# Patient Record
Sex: FEMALE | Race: WHITE | NOT HISPANIC OR LATINO | Employment: OTHER | URBAN - NONMETROPOLITAN AREA
[De-identification: names, ages, dates, MRNs, and addresses within clinical notes are randomized per-mention and may not be internally consistent; named-entity substitution may affect disease eponyms.]

---

## 2021-02-01 ENCOUNTER — OFFICE VISIT (OUTPATIENT)
Dept: CARDIOLOGY | Facility: CLINIC | Age: 71
End: 2021-02-01

## 2021-02-01 VITALS
SYSTOLIC BLOOD PRESSURE: 158 MMHG | DIASTOLIC BLOOD PRESSURE: 88 MMHG | BODY MASS INDEX: 25.85 KG/M2 | HEIGHT: 68 IN | HEART RATE: 71 BPM | TEMPERATURE: 97.8 F | WEIGHT: 170.6 LBS | OXYGEN SATURATION: 98 %

## 2021-02-01 DIAGNOSIS — R06.02 SOB (SHORTNESS OF BREATH): Primary | ICD-10-CM

## 2021-02-01 DIAGNOSIS — R53.83 OTHER FATIGUE: ICD-10-CM

## 2021-02-01 DIAGNOSIS — I10 ESSENTIAL HYPERTENSION: ICD-10-CM

## 2021-02-01 PROCEDURE — 99204 OFFICE O/P NEW MOD 45 MIN: CPT | Performed by: PHYSICIAN ASSISTANT

## 2021-02-01 PROCEDURE — 93000 ELECTROCARDIOGRAM COMPLETE: CPT | Performed by: PHYSICIAN ASSISTANT

## 2021-02-01 RX ORDER — LOSARTAN POTASSIUM 25 MG/1
25 TABLET ORAL DAILY
Qty: 30 TABLET | Refills: 11 | Status: SHIPPED | OUTPATIENT
Start: 2021-02-01 | End: 2021-04-21 | Stop reason: SDUPTHER

## 2021-02-01 RX ORDER — ATENOLOL 25 MG/1
25 TABLET ORAL DAILY
COMMUNITY
End: 2021-04-21 | Stop reason: SDUPTHER

## 2021-02-01 NOTE — PROGRESS NOTES
Problem list     Subjective   Ping Ivory is a 70 y.o. female     Chief Complaint   Patient presents with   • Establish Care     presents for cardiac eval   • Hypertension   • Shortness of Breath   • Peripheral Vascular Disease   Chief complaint: Evaluation today for shortness of air, hypertension, and abnormal vascular studies performed recently.    HPI  The patient presents into the clinic today to establish cardiovascular care.  This patient has been evaluated historically through cardiology eventually with catheterization being performed up to 3 years ago with Dr. Mckay.  The patient had minor disease at that time by her report.  She was lost to follow-up through cardiology after that.  The patient has started developing recurrent symptoms and would like referred back for cardiac evaluation.  She presents today in that setting.  The patient tells me that over the last several weeks to few months, she has developed significant dyspnea which now limits activity.  She has no significant chest discomfort or anginal equivalent symptoms otherwise.  Her dyspnea has become severe and she is concerned that this is an anginal equivalent symptom.  She has no associated PND or orthopnea.  She has no dizziness or syncope, although she does admit to recurrent palpitations and tachycardia at times as well.  She remains hypertensive with an average blood pressure of mid 150s over mid 90s when checked at home by her report.  She did have an evaluation recently via a vascular study which suggested possibly hemodynamically significant disease to the right lower extremity.  The left extremities were normal.  By exam today however the patient has adequate pulses to the right upper and right lower extremities.  She has normal pulses otherwise.  She has equal blood pressures to the bilateral upper extremities as well, of note.  The patient has no further complaints otherwise.    Current Outpatient Medications on File Prior to  Visit   Medication Sig Dispense Refill   • atenolol (TENORMIN) 25 MG tablet Take 25 mg by mouth Daily.       No current facility-administered medications on file prior to visit.        Ciprofloxacin hcl and Penicillins    History reviewed. No pertinent past medical history.    Social History     Socioeconomic History   • Marital status:      Spouse name: Not on file   • Number of children: Not on file   • Years of education: Not on file   • Highest education level: Not on file   Tobacco Use   • Smoking status: Never Smoker   • Smokeless tobacco: Never Used   Substance and Sexual Activity   • Alcohol use: Never     Frequency: Never   • Drug use: Never       Family History   Problem Relation Age of Onset   • Heart attack Mother    • Heart disease Mother    • Hypertension Mother    • Heart attack Father    • Heart disease Father    • Heart failure Father        Review of Systems   Constitutional: Negative.  Negative for chills, fatigue and fever.   HENT: Positive for sinus pressure. Negative for ear pain and sore throat.    Eyes: Positive for visual disturbance.   Respiratory: Positive for shortness of breath (with daily activity). Negative for apnea, cough, chest tightness and wheezing.    Cardiovascular: Positive for chest pain (discomfort). Negative for palpitations and leg swelling.   Gastrointestinal: Negative.  Negative for abdominal pain, blood in stool, constipation, diarrhea, nausea and vomiting.   Endocrine: Negative.    Genitourinary: Negative.  Negative for hematuria.   Musculoskeletal: Positive for arthralgias. Negative for back pain, myalgias, neck pain and neck stiffness.   Skin: Negative.    Allergic/Immunologic: Positive for environmental allergies. Negative for food allergies.   Neurological: Negative for dizziness, syncope, weakness, light-headedness, numbness and headaches.   Hematological: Negative.  Does not bruise/bleed easily.   Psychiatric/Behavioral: Negative.  Negative for agitation  "and sleep disturbance. The patient is not nervous/anxious.        Objective   Vitals:    02/01/21 1436 02/01/21 1458 02/01/21 1459   BP: 148/86 151/77 158/88   BP Location: Left arm Left arm Right arm   Patient Position: Sitting Sitting Sitting   Pulse: 72 75 71   Temp: 97.8 °F (36.6 °C)     SpO2: 98%     Weight: 77.4 kg (170 lb 9.6 oz)     Height: 172.7 cm (68\")        /88 (BP Location: Right arm, Patient Position: Sitting)   Pulse 71   Temp 97.8 °F (36.6 °C)   Ht 172.7 cm (68\")   Wt 77.4 kg (170 lb 9.6 oz)   SpO2 98%   BMI 25.94 kg/m²    Lab Results (most recent)     None        Physical Exam  Vitals signs and nursing note reviewed.   Constitutional:       General: She is not in acute distress.     Appearance: She is well-developed.   HENT:      Head: Normocephalic and atraumatic.   Eyes:      Conjunctiva/sclera: Conjunctivae normal.      Pupils: Pupils are equal, round, and reactive to light.   Neck:      Musculoskeletal: Normal range of motion and neck supple.      Vascular: No JVD.      Trachea: No tracheal deviation.   Cardiovascular:      Rate and Rhythm: Normal rate and regular rhythm.      Heart sounds: Normal heart sounds.   Pulmonary:      Effort: Pulmonary effort is normal.      Breath sounds: Normal breath sounds.   Abdominal:      General: Bowel sounds are normal. There is no distension.      Palpations: Abdomen is soft. There is no mass.      Tenderness: There is no abdominal tenderness. There is no guarding or rebound.   Musculoskeletal: Normal range of motion.         General: No tenderness or deformity.   Skin:     General: Skin is warm and dry.      Coloration: Skin is not pale.      Findings: No erythema or rash.   Neurological:      Mental Status: She is alert and oriented to person, place, and time.   Psychiatric:         Behavior: Behavior normal.         Thought Content: Thought content normal.         Judgment: Judgment normal.           Procedure     ECG 12 Lead    Date/Time: " 2/1/2021 2:52 PM  Performed by: Bill Barrera PA  Authorized by: Bill Barrera PA   Comparison: not compared with previous ECG   Previous ECG: no previous ECG available  Comments: Sinus rhythm without acute changes noted.               Assessment/Plan      Diagnosis Plan   1. SOB (shortness of breath)  ECG 12 Lead    Basic Metabolic Panel    Stress Test With Myocardial Perfusion One Day    Adult Transthoracic Echo Complete W/ Cont if Necessary Per Protocol   2. Essential hypertension  ECG 12 Lead    Basic Metabolic Panel    Stress Test With Myocardial Perfusion One Day    Adult Transthoracic Echo Complete W/ Cont if Necessary Per Protocol   3. Other fatigue       1.  The patient has significant dyspnea which she is concerned represents an anginal equivalent symptom.  She is also concerned about her degree of fatigue.  She would like to pursue further cardiac evaluation.  In that setting, I would schedule the patient for nuclear stress testing for ischemia assessment.  It is noted that her catheterization 3 years or so ago indicated basically normal coronaries by patient report.    2.  We will schedule for an echo to evaluate LV size and function, valvular morphologies, etc.    3.  We did review the patient's vascular scan with her today.  This questioned significant disease.  The patient has adequate pulses to the upper and lower extremities.  Blood pressures are equal in bilateral upper extremities.  She has nothing to suggest vascular disease/claudication, etc.  I reviewed with her that I do not feel that she has any significant vascular disease and do not feel that further evaluation is warranted.    4.  She needs improved blood pressure control.  I will continue atenolol 25 mg daily.  With history of borderline bradycardia to bradycardia, I do not feel that we can increase atenolol.  I will start her on losartan 25 mg 1 tab daily and intensify that in dosing if needed.  She will monitor blood pressures  very closely and call to us for any issues.    5.  I will to see her back to review results of all the above.  She will call immediately for any issues prior to follow-up.           Ping Ivory  reports that she has never smoked. She has never used smokeless tobacco..         Patient's Body mass index is 25.94 kg/m². BMI is within normal parameters. No follow-up required..       Advance Care Planning   ACP discussion was held with the patient during this visit. Patient does not have an advance directive, declines further assistance.      Electronically signed by:

## 2021-02-09 ENCOUNTER — LAB (OUTPATIENT)
Dept: LAB | Facility: HOSPITAL | Age: 71
End: 2021-02-09

## 2021-02-09 DIAGNOSIS — I10 ESSENTIAL HYPERTENSION: ICD-10-CM

## 2021-02-09 DIAGNOSIS — R06.02 SOB (SHORTNESS OF BREATH): ICD-10-CM

## 2021-02-09 LAB
ANION GAP SERPL CALCULATED.3IONS-SCNC: 12.9 MMOL/L (ref 5–15)
BUN SERPL-MCNC: 12 MG/DL (ref 8–23)
BUN/CREAT SERPL: 14.3 (ref 7–25)
CALCIUM SPEC-SCNC: 9.6 MG/DL (ref 8.6–10.5)
CHLORIDE SERPL-SCNC: 104 MMOL/L (ref 98–107)
CO2 SERPL-SCNC: 23.1 MMOL/L (ref 22–29)
CREAT SERPL-MCNC: 0.84 MG/DL (ref 0.57–1)
GFR SERPL CREATININE-BSD FRML MDRD: 67 ML/MIN/1.73
GLUCOSE SERPL-MCNC: 91 MG/DL (ref 65–99)
POTASSIUM SERPL-SCNC: 4 MMOL/L (ref 3.5–5.2)
SODIUM SERPL-SCNC: 140 MMOL/L (ref 136–145)

## 2021-02-09 PROCEDURE — 36415 COLL VENOUS BLD VENIPUNCTURE: CPT

## 2021-02-09 PROCEDURE — 80048 BASIC METABOLIC PNL TOTAL CA: CPT

## 2021-02-11 ENCOUNTER — TELEPHONE (OUTPATIENT)
Dept: CARDIOLOGY | Facility: CLINIC | Age: 71
End: 2021-02-11

## 2021-02-11 NOTE — TELEPHONE ENCOUNTER
Patient is aware. Jossy Galarza MA      ----- Message from ARIC Esposito sent at 2/11/2021 11:24 AM EST -----  Losartan started.  Stable parameters noted.

## 2021-03-16 ENCOUNTER — APPOINTMENT (OUTPATIENT)
Dept: CARDIOLOGY | Facility: HOSPITAL | Age: 71
End: 2021-03-16

## 2021-03-16 ENCOUNTER — HOSPITAL ENCOUNTER (OUTPATIENT)
Dept: CARDIOLOGY | Facility: HOSPITAL | Age: 71
Discharge: HOME | End: 2021-03-16

## 2021-03-16 DIAGNOSIS — I10 ESSENTIAL HYPERTENSION: ICD-10-CM

## 2021-03-16 DIAGNOSIS — R06.02 SOB (SHORTNESS OF BREATH): ICD-10-CM

## 2021-03-16 PROCEDURE — 93306 TTE W/DOPPLER COMPLETE: CPT | Performed by: INTERNAL MEDICINE

## 2021-03-16 PROCEDURE — 93306 TTE W/DOPPLER COMPLETE: CPT

## 2021-03-19 ENCOUNTER — APPOINTMENT (OUTPATIENT)
Dept: CARDIOLOGY | Facility: HOSPITAL | Age: 71
End: 2021-03-19

## 2021-03-19 ENCOUNTER — TELEPHONE (OUTPATIENT)
Dept: CARDIOLOGY | Facility: CLINIC | Age: 71
End: 2021-03-19

## 2021-03-19 NOTE — TELEPHONE ENCOUNTER
The PM notified the pt that performing a P2P through [facility] for her stress test is not an option, due to [facility]'s transition to [facility].  The PM will consult with Bill on how to proceed and someone from our office will call her with an update.  Pt instructed to go to the ED with urgent cardiac symptoms or call our office with worsening symptoms.  Pt verbalized an understanding.

## 2021-03-19 NOTE — TELEPHONE ENCOUNTER
Called patient regarding stress test denial. P2P done. Stress test rescheduled. Patient to call office with any concerns or proceed to ER with worsening symptoms. Patient verbalized understanding. Jossy Galarza MA

## 2021-03-27 LAB
BH CV ECHO MEAS - ACS: 1.8 CM
BH CV ECHO MEAS - AO MAX PG: 3.8 MMHG
BH CV ECHO MEAS - AO MEAN PG: 2 MMHG
BH CV ECHO MEAS - AO ROOT AREA (BSA CORRECTED): 1.4
BH CV ECHO MEAS - AO ROOT AREA: 5.9 CM^2
BH CV ECHO MEAS - AO ROOT DIAM: 2.8 CM
BH CV ECHO MEAS - AO V2 MAX: 97.6 CM/SEC
BH CV ECHO MEAS - AO V2 MEAN: 72.1 CM/SEC
BH CV ECHO MEAS - AO V2 VTI: 26 CM
BH CV ECHO MEAS - BSA(HAYCOCK): 1.9 M^2
BH CV ECHO MEAS - BSA: 1.9 M^2
BH CV ECHO MEAS - BZI_BMI: 25.8 KILOGRAMS/M^2
BH CV ECHO MEAS - BZI_METRIC_HEIGHT: 172.7 CM
BH CV ECHO MEAS - BZI_METRIC_WEIGHT: 77.1 KG
BH CV ECHO MEAS - EDV(CUBED): 62.1 ML
BH CV ECHO MEAS - EDV(MOD-SP4): 67.2 ML
BH CV ECHO MEAS - EDV(TEICH): 68.3 ML
BH CV ECHO MEAS - EF(CUBED): 52.9 %
BH CV ECHO MEAS - EF(MOD-SP2): 45.4 %
BH CV ECHO MEAS - EF(MOD-SP4): 52.2 %
BH CV ECHO MEAS - EF(TEICH): 45.4 %
BH CV ECHO MEAS - ESV(CUBED): 29.2 ML
BH CV ECHO MEAS - ESV(MOD-SP4): 32.1 ML
BH CV ECHO MEAS - ESV(TEICH): 37.3 ML
BH CV ECHO MEAS - FS: 22.2 %
BH CV ECHO MEAS - IVS/LVPW: 1.2
BH CV ECHO MEAS - IVSD: 1.1 CM
BH CV ECHO MEAS - LA DIMENSION: 3.4 CM
BH CV ECHO MEAS - LA/AO: 1.2
BH CV ECHO MEAS - LV DIASTOLIC VOL/BSA (35-75): 35.2 ML/M^2
BH CV ECHO MEAS - LV IVRT: 0.11 SEC
BH CV ECHO MEAS - LV MASS(C)D: 132.2 GRAMS
BH CV ECHO MEAS - LV MASS(C)DI: 69.3 GRAMS/M^2
BH CV ECHO MEAS - LV SYSTOLIC VOL/BSA (12-30): 16.8 ML/M^2
BH CV ECHO MEAS - LVIDD: 4 CM
BH CV ECHO MEAS - LVIDS: 3.1 CM
BH CV ECHO MEAS - LVLD AP4: 6.3 CM
BH CV ECHO MEAS - LVLS AP4: 5.4 CM
BH CV ECHO MEAS - LVOT AREA (M): 3.1 CM^2
BH CV ECHO MEAS - LVOT AREA: 3.1 CM^2
BH CV ECHO MEAS - LVOT DIAM: 2 CM
BH CV ECHO MEAS - LVPWD: 0.95 CM
BH CV ECHO MEAS - MV A MAX VEL: 70.3 CM/SEC
BH CV ECHO MEAS - MV DEC SLOPE: 521 CM/SEC^2
BH CV ECHO MEAS - MV E MAX VEL: 85.7 CM/SEC
BH CV ECHO MEAS - MV E/A: 1.2
BH CV ECHO MEAS - RAP SYSTOLE: 10 MMHG
BH CV ECHO MEAS - RVDD: 3.2 CM
BH CV ECHO MEAS - RVSP: 25.7 MMHG
BH CV ECHO MEAS - SI(AO): 81 ML/M^2
BH CV ECHO MEAS - SI(CUBED): 17.2 ML/M^2
BH CV ECHO MEAS - SI(MOD-SP4): 18.4 ML/M^2
BH CV ECHO MEAS - SI(TEICH): 16.3 ML/M^2
BH CV ECHO MEAS - SV(AO): 154.4 ML
BH CV ECHO MEAS - SV(CUBED): 32.9 ML
BH CV ECHO MEAS - SV(MOD-SP4): 35.1 ML
BH CV ECHO MEAS - SV(TEICH): 31 ML
BH CV ECHO MEAS - TR MAX VEL: 198 CM/SEC
MAXIMAL PREDICTED HEART RATE: 148 BPM
STRESS TARGET HR: 126 BPM

## 2021-03-30 ENCOUNTER — TELEPHONE (OUTPATIENT)
Dept: CARDIOLOGY | Facility: CLINIC | Age: 71
End: 2021-03-30

## 2021-03-30 NOTE — TELEPHONE ENCOUNTER
Left VM for patient to return call - test result normal , keep appt for 5/26/21 @ 3:30 pm     AT [facility]         ----- Message from Jossy Galarza MA sent at 3/30/2021 12:04 PM EDT -----    ----- Message -----  From: Bill Barrera PA  Sent: 3/30/2021   8:47 AM EDT  To: Jossy Galarza MA    Routine follow-up.        Result Text  1.  LV size, function, wall motion, and wall thickness are normal.  LV wall thickness is at the upper limits of normal.  Visually estimated ejection fraction is 50 to 55%.  Three-dimensional ejection fraction is 64%.  Grade 1A diastolic dysfunction with mild left atrial enlargement.  Right heart chambers are normal.  No septal defect or intracavitary mass or thrombus.     2.  Valves are morphologically normal with no stenotic lesions or valve associated masses or thrombi.  There is trivial MR, trivial AI, and physiologic TR.     3.  No pericardial or great vessel pathology.     4.  Pulmonary artery pressures cannot be calculated.

## 2021-03-31 NOTE — TELEPHONE ENCOUNTER
Pt LVM stating she was mowing the lawn and missed a call from us.       Called and informed pt of normal results and appt, she verbalized understanding.

## 2021-04-16 ENCOUNTER — HOSPITAL ENCOUNTER (OUTPATIENT)
Dept: CARDIOLOGY | Facility: HOSPITAL | Age: 71
Discharge: HOME | End: 2021-04-16

## 2021-04-16 DIAGNOSIS — R06.02 SOB (SHORTNESS OF BREATH): ICD-10-CM

## 2021-04-16 DIAGNOSIS — I10 ESSENTIAL HYPERTENSION: ICD-10-CM

## 2021-04-16 PROCEDURE — 93018 CV STRESS TEST I&R ONLY: CPT | Performed by: INTERNAL MEDICINE

## 2021-04-16 PROCEDURE — A9500 TC99M SESTAMIBI: HCPCS | Performed by: INTERNAL MEDICINE

## 2021-04-16 PROCEDURE — 0 TECHNETIUM SESTAMIBI: Performed by: INTERNAL MEDICINE

## 2021-04-16 PROCEDURE — 93016 CV STRESS TEST SUPVJ ONLY: CPT | Performed by: NURSE PRACTITIONER

## 2021-04-16 PROCEDURE — 93017 CV STRESS TEST TRACING ONLY: CPT

## 2021-04-16 PROCEDURE — 78452 HT MUSCLE IMAGE SPECT MULT: CPT | Performed by: INTERNAL MEDICINE

## 2021-04-16 PROCEDURE — 78452 HT MUSCLE IMAGE SPECT MULT: CPT

## 2021-04-16 RX ADMIN — TECHNETIUM TC 99M SESTAMIBI 1 DOSE: 1 INJECTION INTRAVENOUS at 09:52

## 2021-04-16 RX ADMIN — TECHNETIUM TC 99M SESTAMIBI 1 DOSE: 1 INJECTION INTRAVENOUS at 08:09

## 2021-04-18 LAB
BH CV REST NUCLEAR ISOTOPE DOSE: 10 MCI
BH CV STRESS NUCLEAR ISOTOPE DOSE: 30 MCI
BH CV STRESS RECOVERY BP: NORMAL MMHG
BH CV STRESS RECOVERY HR: 71 BPM
MAXIMAL PREDICTED HEART RATE: 148 BPM
PERCENT MAX PREDICTED HR: 85.14 %
STRESS BASELINE BP: NORMAL MMHG
STRESS BASELINE HR: 62 BPM
STRESS PERCENT HR: 100 %
STRESS POST ESTIMATED WORKLOAD: 4.6 METS
STRESS POST EXERCISE DUR MIN: 4 MIN
STRESS POST EXERCISE DUR SEC: 25 SEC
STRESS POST PEAK BP: NORMAL MMHG
STRESS POST PEAK HR: 126 BPM
STRESS TARGET HR: 126 BPM

## 2021-04-20 ENCOUNTER — TELEPHONE (OUTPATIENT)
Dept: CARDIOLOGY | Facility: CLINIC | Age: 71
End: 2021-04-20

## 2021-04-20 NOTE — TELEPHONE ENCOUNTER
Patient aware of stress test results. Follow up scheduled in opening tomorrow with Gibran. Jossy Galarza MA      ----- Message from Olga Foley MA sent at 4/20/2021  1:01 PM EDT -----    ----- Message -----  From: Bill Barrera PA  Sent: 4/20/2021   9:57 AM EDT  To: Olga Foley MA    Follow-up within a couple of weeks.    Result Text  1.  Scintigraphy demonstrates a small to moderate sized mildly dense ischemic defect involving the inferoapical and lateral walls.     2.  Preserved post-rest ejection fraction of 65% with no focal wall motion abnormalities.     3.  No evidence of pharmacologically induced transient ischemic dilation or of increased lung uptake of radiopharmaceutical.

## 2021-04-21 ENCOUNTER — OFFICE VISIT (OUTPATIENT)
Dept: CARDIOLOGY | Facility: CLINIC | Age: 71
End: 2021-04-21

## 2021-04-21 VITALS
BODY MASS INDEX: 25.58 KG/M2 | HEART RATE: 62 BPM | OXYGEN SATURATION: 100 % | SYSTOLIC BLOOD PRESSURE: 198 MMHG | WEIGHT: 168.8 LBS | DIASTOLIC BLOOD PRESSURE: 102 MMHG | HEIGHT: 68 IN

## 2021-04-21 DIAGNOSIS — R06.02 SOB (SHORTNESS OF BREATH): ICD-10-CM

## 2021-04-21 DIAGNOSIS — R53.83 FATIGUE, UNSPECIFIED TYPE: ICD-10-CM

## 2021-04-21 DIAGNOSIS — Z01.818 PREPROCEDURAL EXAMINATION: ICD-10-CM

## 2021-04-21 DIAGNOSIS — I10 ESSENTIAL HYPERTENSION: Primary | ICD-10-CM

## 2021-04-21 PROCEDURE — 99214 OFFICE O/P EST MOD 30 MIN: CPT | Performed by: PHYSICIAN ASSISTANT

## 2021-04-21 RX ORDER — ATORVASTATIN CALCIUM 40 MG/1
40 TABLET, FILM COATED ORAL DAILY
Qty: 30 TABLET | Refills: 11 | Status: SHIPPED | OUTPATIENT
Start: 2021-04-21 | End: 2021-06-04 | Stop reason: SDUPTHER

## 2021-04-21 RX ORDER — ASPIRIN 81 MG/1
81 TABLET ORAL DAILY
Qty: 30 TABLET | Refills: 5 | Status: SHIPPED | OUTPATIENT
Start: 2021-04-21

## 2021-04-21 RX ORDER — ATENOLOL 25 MG/1
25 TABLET ORAL DAILY
Qty: 90 TABLET | Refills: 3 | Status: SHIPPED | OUTPATIENT
Start: 2021-04-21 | End: 2022-06-20 | Stop reason: SDUPTHER

## 2021-04-21 RX ORDER — LOSARTAN POTASSIUM 100 MG/1
100 TABLET ORAL DAILY
Qty: 90 TABLET | Refills: 3 | Status: SHIPPED | OUTPATIENT
Start: 2021-04-21 | End: 2022-04-04

## 2021-04-21 NOTE — PROGRESS NOTES
Problem list     Subjective   Ping Ivory is a 70 y.o. female     Chief Complaint   Patient presents with   • Follow-up     Problem list  1.  Profound dyspnea  1.1 stress test April 2021 suggest inferoapical and lateral wall ischemia preserved LV function  2.  History of cardiac catheterization 3 years ago, and adequate data  3.  Hypertension      HPI    Patient is a 70-year-old female that presents back to the office to follow-up.  She saw the office last and was placed on medication to help with blood pressure but because of symptoms, was scheduled undergo testing.  Patient has profound levels of dyspnea and apparently has had lung evaluation with no significant lung disease.  Stress test that showed evidence of possible ischemia.    She is very concerned.  She does not describe any pain or pressure but her dyspnea has been quite profound.  When trying to exert or do activity, she is profoundly short of breath even walking to her car.  Again, she has had lung evaluation with no significant lung disease identified and patient is concerned that she has this profound levels of symptoms despite pulmonary evaluation being stable.  She does not describe PND orthopnea.    She does not describe palpitating having dysrhythmic symptoms.  She does have hypertension and has had poorly controlled blood pressure recently.  She was placed on medication but continues to have significant hypertensive excursions.  Otherwise is stable today      Current Outpatient Medications on File Prior to Visit   Medication Sig Dispense Refill   • [DISCONTINUED] atenolol (TENORMIN) 25 MG tablet Take 25 mg by mouth Daily.     • [DISCONTINUED] losartan (Cozaar) 25 MG tablet Take 1 tablet by mouth Daily. 30 tablet 11     No current facility-administered medications on file prior to visit.       Ciprofloxacin hcl and Penicillins    Past Medical History:   Diagnosis Date   • Hypertension        Social History     Socioeconomic History   • Marital  "status:      Spouse name: Not on file   • Number of children: Not on file   • Years of education: Not on file   • Highest education level: Not on file   Tobacco Use   • Smoking status: Never Smoker   • Smokeless tobacco: Never Used   Substance and Sexual Activity   • Alcohol use: Never   • Drug use: Never   • Sexual activity: Defer       Family History   Problem Relation Age of Onset   • Heart attack Mother    • Heart disease Mother    • Hypertension Mother    • Heart attack Father    • Heart disease Father    • Heart failure Father        Review of Systems   Constitutional: Negative for chills, fatigue and fever.   HENT: Positive for rhinorrhea. Negative for congestion and sore throat.    Eyes: Positive for visual disturbance (glasses).   Respiratory: Positive for shortness of breath. Negative for apnea and chest tightness.    Cardiovascular: Negative.  Negative for chest pain, palpitations and leg swelling.   Gastrointestinal: Negative.    Endocrine: Negative.    Genitourinary: Negative.    Musculoskeletal: Negative.  Negative for back pain, gait problem, neck pain and neck stiffness.   Skin: Negative for rash and wound.   Allergic/Immunologic: Positive for environmental allergies (seasonal allergies) and food allergies (eggs).   Neurological: Negative.  Negative for dizziness, weakness, light-headedness, numbness and headaches.   Hematological: Negative.  Does not bruise/bleed easily.   Psychiatric/Behavioral: Positive for sleep disturbance (denies SoA at HS).       Objective   Vitals:    04/21/21 1055   BP: (!) 198/102   BP Location: Left arm   Patient Position: Sitting   Pulse: 62   SpO2: 100%   Weight: 76.6 kg (168 lb 12.8 oz)   Height: 172.7 cm (67.99\")      BP (!) 198/102 (BP Location: Left arm, Patient Position: Sitting)   Pulse 62   Ht 172.7 cm (67.99\")   Wt 76.6 kg (168 lb 12.8 oz)   SpO2 100%   BMI 25.67 kg/m²     Lab Results (most recent)     None          Physical Exam  Vitals and nursing " note reviewed.   Constitutional:       General: She is not in acute distress.     Appearance: Normal appearance. She is well-developed.   HENT:      Head: Normocephalic and atraumatic.   Eyes:      General: No scleral icterus.        Right eye: No discharge.         Left eye: No discharge.      Conjunctiva/sclera: Conjunctivae normal.   Neck:      Vascular: No carotid bruit.   Cardiovascular:      Rate and Rhythm: Normal rate and regular rhythm.      Heart sounds: Normal heart sounds. No murmur heard.   No friction rub. No gallop.    Pulmonary:      Effort: Pulmonary effort is normal. No respiratory distress.      Breath sounds: Normal breath sounds. No wheezing or rales.   Chest:      Chest wall: No tenderness.   Musculoskeletal:      Right lower leg: No edema.      Left lower leg: No edema.   Skin:     General: Skin is warm and dry.      Coloration: Skin is not pale.      Findings: No erythema or rash.   Neurological:      Mental Status: She is alert and oriented to person, place, and time.      Cranial Nerves: No cranial nerve deficit.   Psychiatric:         Behavior: Behavior normal.         Procedure   Procedures       Assessment/Plan     Problems Addressed this Visit        Cardiac and Vasculature    Essential hypertension - Primary    Relevant Medications    losartan (Cozaar) 100 MG tablet    atenolol (TENORMIN) 25 MG tablet    Other Relevant Orders    [facility]    CBC & Differential    Comprehensive Metabolic Panel    D-dimer, Quantitative       Pulmonary and Pneumonias    SOB (shortness of breath)    Relevant Orders    [facility]    CBC & Differential    Comprehensive Metabolic Panel    D-dimer, Quantitative       Symptoms and Signs    Fatigue    Relevant Orders    [facility]    CBC & Differential    Comprehensive Metabolic Panel    D-dimer, Quantitative      Other Visit Diagnoses     Preprocedural examination        Relevant Orders    COVID-19,[facility] ROUTINE, NP/OP SWAB IN  TRANSPORT MEDIA OR ESWAB 72 HR TAT - Swab, Nasopharynx      Diagnoses       Codes Comments    Essential hypertension    -  Primary ICD-10-CM: I10  ICD-9-CM: 401.9     SOB (shortness of breath)     ICD-10-CM: R06.02  ICD-9-CM: 786.05     Fatigue, unspecified type     ICD-10-CM: R53.83  ICD-9-CM: 780.79     Preprocedural examination     ICD-10-CM: Z01.818  ICD-9-CM: V72.84         Recommendation  1.  I am concerned about patient's profound level of dyspnea in the setting that she does not have any significant lung disease.  Stress test suggest potential ischemia in the apical segment but also lateral segment possibly representing 2 vascular distributions.  This would be at the very least, an intermediate stress test.  Because she continues to have profound levels of symptoms that are limiting her functional status with risk factors, she will be scheduled for catheterization to exclude potential coronary disease    2.  I am increasing losartan to 100 mg hopefully help with blood pressure issues.  We will place her on antiplatelet and statin therapy.    3.  We will see her back for follow-up after catheterization.  Follow-up with primary as scheduled             Ping Ivory  reports that she has never smoked. She has never used smokeless tobacco.        Patient's Body mass index is 25.67 kg/m². BMI is within normal parameters. No follow-up required..       Advance Care Planning   ACP discussion was held with the patient during this visit. Patient has an advance directive (not in EMR), copy requested.    Electronically signed by:

## 2021-04-21 NOTE — PATIENT INSTRUCTIONS

## 2021-04-26 ENCOUNTER — TELEPHONE (OUTPATIENT)
Dept: CARDIOLOGY | Facility: CLINIC | Age: 71
End: 2021-04-26

## 2021-04-26 NOTE — TELEPHONE ENCOUNTER
Gibran Coats PA Lanum, Emily  Caller: Unspecified (Today,  3:24 PM)  Just routine medication prior to catheterization         Called and informed pt of the above, she verbalized understanding.

## 2021-04-26 NOTE — TELEPHONE ENCOUNTER
Pty LVM stating she doesn't know why she was put on Lipitor, stated she didn't know she had any issues w/ cholesterol.         Per chart review, no recent cholesterol levels in Epic. OV note doesn't mention why rx was started.

## 2021-05-13 ENCOUNTER — TELEPHONE (OUTPATIENT)
Dept: CARDIOLOGY | Facility: CLINIC | Age: 71
End: 2021-05-13

## 2021-05-13 NOTE — TELEPHONE ENCOUNTER
Left voicemail for patient to return call to clinic to let me know where she had previous cardiac cath so we can obtain report.

## 2021-05-19 ENCOUNTER — TELEPHONE (OUTPATIENT)
Dept: CARDIOLOGY | Facility: CLINIC | Age: 71
End: 2021-05-19

## 2021-05-19 NOTE — TELEPHONE ENCOUNTER
Pt LVM stating that she wants to register and get labs and everything at [facility] at one time.     Per chart review, pt's [facility] is scheduled for 5/27/21, needs to get labs done ASAP so we have time to treat if issue w/ PLT or CREAT.       Called and advised pt to get labs done today or tomorrow, stated she doesn't register until am of procedure and to be 2 hours early, as stated on her paperwork. She stated she would come here for labs.

## 2021-05-20 ENCOUNTER — LAB (OUTPATIENT)
Dept: LAB | Facility: HOSPITAL | Age: 71
End: 2021-05-20

## 2021-05-20 DIAGNOSIS — R06.02 SOB (SHORTNESS OF BREATH): ICD-10-CM

## 2021-05-20 DIAGNOSIS — I10 ESSENTIAL HYPERTENSION: ICD-10-CM

## 2021-05-20 DIAGNOSIS — R53.83 FATIGUE, UNSPECIFIED TYPE: ICD-10-CM

## 2021-05-20 LAB
ALBUMIN SERPL-MCNC: 4.52 G/DL (ref 3.5–5.2)
ALBUMIN/GLOB SERPL: 1.6 G/DL
ALP SERPL-CCNC: 101 U/L (ref 39–117)
ALT SERPL W P-5'-P-CCNC: 20 U/L (ref 1–33)
ANION GAP SERPL CALCULATED.3IONS-SCNC: 9.9 MMOL/L (ref 5–15)
AST SERPL-CCNC: 41 U/L (ref 1–32)
BASOPHILS # BLD AUTO: 0.06 10*3/MM3 (ref 0–0.2)
BASOPHILS NFR BLD AUTO: 1 % (ref 0–1.5)
BILIRUB SERPL-MCNC: 0.5 MG/DL (ref 0–1.2)
BUN SERPL-MCNC: 14 MG/DL (ref 8–23)
BUN/CREAT SERPL: 14 (ref 7–25)
CALCIUM SPEC-SCNC: 9.6 MG/DL (ref 8.6–10.5)
CHLORIDE SERPL-SCNC: 103 MMOL/L (ref 98–107)
CO2 SERPL-SCNC: 27.1 MMOL/L (ref 22–29)
CREAT SERPL-MCNC: 1 MG/DL (ref 0.57–1)
D DIMER PPP FEU-MCNC: 0.35 MCGFEU/ML (ref 0–0.5)
DEPRECATED RDW RBC AUTO: 45.8 FL (ref 37–54)
EOSINOPHIL # BLD AUTO: 0.1 10*3/MM3 (ref 0–0.4)
EOSINOPHIL NFR BLD AUTO: 1.6 % (ref 0.3–6.2)
ERYTHROCYTE [DISTWIDTH] IN BLOOD BY AUTOMATED COUNT: 13.2 % (ref 12.3–15.4)
GFR SERPL CREATININE-BSD FRML MDRD: 55 ML/MIN/1.73
GLOBULIN UR ELPH-MCNC: 2.9 GM/DL
GLUCOSE SERPL-MCNC: 81 MG/DL (ref 65–99)
HCT VFR BLD AUTO: 43.3 % (ref 34–46.6)
HGB BLD-MCNC: 13.7 G/DL (ref 12–15.9)
IMM GRANULOCYTES # BLD AUTO: 0.02 10*3/MM3 (ref 0–0.05)
IMM GRANULOCYTES NFR BLD AUTO: 0.3 % (ref 0–0.5)
LYMPHOCYTES # BLD AUTO: 1.74 10*3/MM3 (ref 0.7–3.1)
LYMPHOCYTES NFR BLD AUTO: 28.1 % (ref 19.6–45.3)
MCH RBC QN AUTO: 30 PG (ref 26.6–33)
MCHC RBC AUTO-ENTMCNC: 31.6 G/DL (ref 31.5–35.7)
MCV RBC AUTO: 94.7 FL (ref 79–97)
MONOCYTES # BLD AUTO: 0.54 10*3/MM3 (ref 0.1–0.9)
MONOCYTES NFR BLD AUTO: 8.7 % (ref 5–12)
NEUTROPHILS NFR BLD AUTO: 3.74 10*3/MM3 (ref 1.7–7)
NEUTROPHILS NFR BLD AUTO: 60.3 % (ref 42.7–76)
NRBC BLD AUTO-RTO: 0 /100 WBC (ref 0–0.2)
PLATELET # BLD AUTO: 179 10*3/MM3 (ref 140–450)
PMV BLD AUTO: 11.5 FL (ref 6–12)
POTASSIUM SERPL-SCNC: 4 MMOL/L (ref 3.5–5.2)
PROT SERPL-MCNC: 7.4 G/DL (ref 6–8.5)
RBC # BLD AUTO: 4.57 10*6/MM3 (ref 3.77–5.28)
SODIUM SERPL-SCNC: 140 MMOL/L (ref 136–145)
WBC # BLD AUTO: 6.2 10*3/MM3 (ref 3.4–10.8)

## 2021-05-20 PROCEDURE — 36415 COLL VENOUS BLD VENIPUNCTURE: CPT

## 2021-05-20 PROCEDURE — 80053 COMPREHEN METABOLIC PANEL: CPT

## 2021-05-20 PROCEDURE — 85379 FIBRIN DEGRADATION QUANT: CPT

## 2021-05-20 PROCEDURE — 85025 COMPLETE CBC W/AUTO DIFF WBC: CPT

## 2021-05-27 ENCOUNTER — OUTSIDE FACILITY SERVICE (OUTPATIENT)
Dept: CARDIOLOGY | Facility: CLINIC | Age: 71
End: 2021-05-27

## 2021-05-27 DIAGNOSIS — I25.84 CORONARY ARTERY DISEASE DUE TO CALCIFIED CORONARY LESION: Primary | ICD-10-CM

## 2021-05-27 DIAGNOSIS — I25.10 CORONARY ARTERY DISEASE DUE TO CALCIFIED CORONARY LESION: Primary | ICD-10-CM

## 2021-05-27 PROCEDURE — 92928 PRQ TCAT PLMT NTRAC ST 1 LES: CPT | Performed by: INTERNAL MEDICINE

## 2021-05-27 PROCEDURE — 92978 ENDOLUMINL IVUS OCT C 1ST: CPT | Performed by: INTERNAL MEDICINE

## 2021-05-27 PROCEDURE — 93458 L HRT ARTERY/VENTRICLE ANGIO: CPT | Performed by: INTERNAL MEDICINE

## 2021-05-27 RX ORDER — CLOPIDOGREL BISULFATE 75 MG/1
75 TABLET ORAL DAILY
Qty: 30 TABLET | Refills: 11 | Status: SHIPPED | OUTPATIENT
Start: 2021-05-27 | End: 2021-08-23 | Stop reason: SDUPTHER

## 2021-05-27 NOTE — TELEPHONE ENCOUNTER
T/O PER DR. CHIU TO SEND IN PLAVIX 75 MG PO DAILY TO PATIENT'S PHARMACY DUE TO REQUIRED STENTING PER [facility] TODAY. SCRIPT ELECTRO'D TO PHARMACY. PH,LPN

## 2021-06-04 ENCOUNTER — OFFICE VISIT (OUTPATIENT)
Dept: CARDIOLOGY | Facility: CLINIC | Age: 71
End: 2021-06-04

## 2021-06-04 VITALS
DIASTOLIC BLOOD PRESSURE: 89 MMHG | SYSTOLIC BLOOD PRESSURE: 174 MMHG | HEART RATE: 66 BPM | OXYGEN SATURATION: 99 % | WEIGHT: 168 LBS | HEIGHT: 68 IN | BODY MASS INDEX: 25.46 KG/M2

## 2021-06-04 DIAGNOSIS — I25.118 CORONARY ARTERY DISEASE OF NATIVE ARTERY OF NATIVE HEART WITH STABLE ANGINA PECTORIS (HCC): Primary | ICD-10-CM

## 2021-06-04 DIAGNOSIS — I10 ESSENTIAL HYPERTENSION: ICD-10-CM

## 2021-06-04 DIAGNOSIS — Z98.890 HISTORY OF LEFT HEART CATHETERIZATION (LHC): ICD-10-CM

## 2021-06-04 PROCEDURE — 99214 OFFICE O/P EST MOD 30 MIN: CPT | Performed by: NURSE PRACTITIONER

## 2021-06-04 RX ORDER — ATORVASTATIN CALCIUM 40 MG/1
40 TABLET, FILM COATED ORAL DAILY
Qty: 30 TABLET | Refills: 11 | Status: SHIPPED | OUTPATIENT
Start: 2021-06-04 | End: 2021-07-07 | Stop reason: SDUPTHER

## 2021-06-04 NOTE — PATIENT INSTRUCTIONS

## 2021-06-04 NOTE — PROGRESS NOTES
Subjective     Ping Ivory is a 70 y.o. female who presents to day for Heart cath follow up (May 27th x one stent mLAD, rt wrist).    CHIEF COMPLIANT  Chief Complaint   Patient presents with   • Heart cath follow up     May 27th x one stent mLAD, rt wrist       Active Problems:  Problem List Items Addressed This Visit        Cardiac and Vasculature    Essential hypertension    Relevant Orders    Lipid Panel      Other Visit Diagnoses     Coronary artery disease of native artery of native heart with stable angina pectoris (CMS/HCC)    -  Primary    Relevant Medications    atorvastatin (LIPITOR) 40 MG tablet    Other Relevant Orders    Lipid Panel    History of left heart catheterization (LHC)            Problem list  1.  Profound dyspnea  1.1 stress test April 2021 suggest inferoapical and lateral wall ischemia preserved LV function  1.2 left heart cath 5/27/2021 stent to the LAD  2.  History of cardiac catheterization 3 years ago, and adequate data  3.  Hypertension      HPI  HPI   Patient is a pleasant 70-year-old female who presents to the clinic today for follow-up status post left heart cath with Dr. Russell Moreira on 5/27/2021.  Patient was referred for left heart cath given shortness of breath and ischemia on nuclear stress test.  Patient underwent left heart cath with selective left and right coronary cineangiography, left ventriculography, and intravascular ultrasound with stenting to the left anterior descending artery.  Ejection fraction was estimated at 55% with no focal wall motion abnormalities or mitral regurgitation.  Left ventricular end-diastolic pressure was 11 mmHg.  Patient presents today with a stable right radial cath site which is clean dry and intact without evidence of hematoma, redness or drainage.    Patient states that she feels much improved since having her heart cath.  She states that she is able to ambulate down to her mailbox without shortness of breath.  She denies any chest pain  or pressure.  She denies any palpitations or fluttering.  She denies any PND or orthopnea.  She denies any dizziness or syncope.  She denies any edema or swelling.    PRIOR MEDS  Current Outpatient Medications on File Prior to Visit   Medication Sig Dispense Refill   • aspirin (aspirin) 81 MG EC tablet Take 1 tablet by mouth Daily. 30 tablet 5   • atenolol (TENORMIN) 25 MG tablet Take 1 tablet by mouth Daily. 90 tablet 3   • clopidogrel (PLAVIX) 75 MG tablet Take 1 tablet by mouth Daily. 30 tablet 11   • losartan (Cozaar) 100 MG tablet Take 1 tablet by mouth Daily. 90 tablet 3     No current facility-administered medications on file prior to visit.       ALLERGIES  Ciprofloxacin hcl and Penicillins    HISTORY  Past Medical History:   Diagnosis Date   • Hypertension        Social History     Socioeconomic History   • Marital status:      Spouse name: Not on file   • Number of children: Not on file   • Years of education: Not on file   • Highest education level: Not on file   Tobacco Use   • Smoking status: Never Smoker   • Smokeless tobacco: Never Used   Substance and Sexual Activity   • Alcohol use: Never   • Drug use: Never   • Sexual activity: Defer       Family History   Problem Relation Age of Onset   • Heart attack Mother    • Heart disease Mother    • Hypertension Mother    • Heart attack Father    • Heart disease Father    • Heart failure Father        Review of Systems   Constitutional: Negative.  Negative for chills, fatigue and fever.   HENT: Negative.  Negative for congestion, sinus pain and sore throat.    Eyes: Positive for visual disturbance (glasses).   Respiratory: Positive for cough. Negative for chest tightness and shortness of breath.    Cardiovascular: Negative.  Negative for chest pain, palpitations and leg swelling.   Gastrointestinal: Negative.  Negative for abdominal pain, blood in stool, constipation, diarrhea, nausea and vomiting.   Endocrine: Positive for cold intolerance. Negative  "for heat intolerance.   Genitourinary: Negative.  Negative for dysuria, frequency, hematuria and urgency.   Musculoskeletal: Negative.  Negative for arthralgias, back pain and neck pain.   Skin: Negative.  Negative for rash.   Allergic/Immunologic: Positive for environmental allergies and food allergies (eggs).   Neurological: Negative.  Negative for dizziness, syncope and light-headedness.   Hematological: Negative.  Does not bruise/bleed easily.   Psychiatric/Behavioral: Negative for sleep disturbance (denies waking with soa or cp).       Objective     VITALS: /89 (BP Location: Left arm, Patient Position: Sitting)   Pulse 66   Ht 172.7 cm (68\")   Wt 76.2 kg (168 lb)   SpO2 99%   BMI 25.54 kg/m²     LABS:   Lab Results (most recent)     None          IMAGING:   No Images in the past 120 days found..    EXAM:  Physical Exam  Vitals and nursing note reviewed.   Constitutional:       General: She is not in acute distress.     Appearance: Normal appearance. She is well-developed.   HENT:      Head: Normocephalic and atraumatic.   Eyes:      General: No scleral icterus.        Right eye: No discharge.         Left eye: No discharge.      Conjunctiva/sclera: Conjunctivae normal.   Neck:      Vascular: No carotid bruit.   Cardiovascular:      Rate and Rhythm: Normal rate and regular rhythm.      Pulses:           Radial pulses are 2+ on the right side and 2+ on the left side.      Heart sounds: Normal heart sounds. No murmur heard.   No friction rub. No gallop.    Pulmonary:      Effort: Pulmonary effort is normal. No respiratory distress.      Breath sounds: Normal breath sounds. No wheezing or rales.   Chest:      Chest wall: No tenderness.   Musculoskeletal:      Right lower leg: No edema.      Left lower leg: No edema.   Skin:     General: Skin is warm and dry.      Capillary Refill: Capillary refill takes less than 2 seconds.      Coloration: Skin is not pale.      Findings: No erythema or rash.        "   Neurological:      Mental Status: She is alert and oriented to person, place, and time.      Cranial Nerves: No cranial nerve deficit.   Psychiatric:         Behavior: Behavior normal.         Procedure   Procedures       Assessment/Plan     Diagnoses and all orders for this visit:    1. Coronary artery disease of native artery of native heart with stable angina pectoris (CMS/HCC) (Primary)  -     atorvastatin (LIPITOR) 40 MG tablet; Take 1 tablet by mouth Daily.  Dispense: 30 tablet; Refill: 11  -     Lipid Panel; Future    2. History of left heart catheterization (LHC)    3. Essential hypertension  -     Lipid Panel; Future      Patient doing well from a cardiac standpoint.  Patient will continue dual antiplatelet therapy with Plavix and aspirin giving recent stent to the left anterior descending artery.  Patient denies any bleeding or blood loss.  She will monitor for such and report any bleeding.  We will start the patient on Lipitor for cholesterol management and get a lipid panel prior to her next visit. Consider modifying antihypertensive medications due to poorly controlled blood pressure.  We will see the patient back in 6 months or sooner for new onset or worsening symptoms.    Return in about 6 months (around 12/4/2021).      Advance Care Planning   ACP discussion was held with the patient during this visit. Patient does not have an advance directive, declines further assistance.          MEDS ORDERED DURING VISIT:  New Medications Ordered This Visit   Medications   • atorvastatin (LIPITOR) 40 MG tablet     Sig: Take 1 tablet by mouth Daily.     Dispense:  30 tablet     Refill:  11       As always, Ion Mason APRN  I appreciate very much the opportunity to participate in the cardiovascular care of your patients. Please do not hesitate to call me with any questions with regards to Ping Ivory evaluation and management.     This document has been electronically signed by Veronica Wilson  APRN  June 11, 2021 12:16 EDT

## 2021-07-07 DIAGNOSIS — I25.118 CORONARY ARTERY DISEASE OF NATIVE ARTERY OF NATIVE HEART WITH STABLE ANGINA PECTORIS (HCC): ICD-10-CM

## 2021-07-07 RX ORDER — ATORVASTATIN CALCIUM 40 MG/1
40 TABLET, FILM COATED ORAL DAILY
Qty: 90 TABLET | Refills: 3 | Status: SHIPPED | OUTPATIENT
Start: 2021-07-07 | End: 2021-11-02 | Stop reason: SDUPTHER

## 2021-08-23 DIAGNOSIS — I25.10 CORONARY ARTERY DISEASE DUE TO CALCIFIED CORONARY LESION: ICD-10-CM

## 2021-08-23 DIAGNOSIS — I25.84 CORONARY ARTERY DISEASE DUE TO CALCIFIED CORONARY LESION: ICD-10-CM

## 2021-08-23 RX ORDER — CLOPIDOGREL BISULFATE 75 MG/1
75 TABLET ORAL DAILY
Qty: 90 TABLET | Refills: 3 | Status: SHIPPED | OUTPATIENT
Start: 2021-08-23 | End: 2022-10-11

## 2021-10-28 ENCOUNTER — OFFICE VISIT (OUTPATIENT)
Dept: CARDIOLOGY | Facility: CLINIC | Age: 71
End: 2021-10-28

## 2021-10-28 VITALS
OXYGEN SATURATION: 99 % | HEART RATE: 63 BPM | SYSTOLIC BLOOD PRESSURE: 172 MMHG | HEIGHT: 68 IN | WEIGHT: 166.2 LBS | BODY MASS INDEX: 25.19 KG/M2 | DIASTOLIC BLOOD PRESSURE: 92 MMHG

## 2021-10-28 DIAGNOSIS — I25.84 CORONARY ARTERY DISEASE DUE TO CALCIFIED CORONARY LESION: Primary | ICD-10-CM

## 2021-10-28 DIAGNOSIS — R06.02 SOB (SHORTNESS OF BREATH): ICD-10-CM

## 2021-10-28 DIAGNOSIS — R53.83 FATIGUE, UNSPECIFIED TYPE: ICD-10-CM

## 2021-10-28 DIAGNOSIS — I10 ESSENTIAL HYPERTENSION: ICD-10-CM

## 2021-10-28 DIAGNOSIS — I25.10 CORONARY ARTERY DISEASE DUE TO CALCIFIED CORONARY LESION: Primary | ICD-10-CM

## 2021-10-28 PROCEDURE — 99214 OFFICE O/P EST MOD 30 MIN: CPT | Performed by: PHYSICIAN ASSISTANT

## 2021-10-28 RX ORDER — ISOSORBIDE MONONITRATE 30 MG/1
30 TABLET, EXTENDED RELEASE ORAL DAILY
Qty: 30 TABLET | Refills: 11 | Status: SHIPPED | OUTPATIENT
Start: 2021-10-28 | End: 2021-10-28

## 2021-10-28 RX ORDER — ISOSORBIDE MONONITRATE 30 MG/1
30 TABLET, EXTENDED RELEASE ORAL DAILY
Qty: 30 TABLET | Refills: 11 | Status: SHIPPED | OUTPATIENT
Start: 2021-10-28 | End: 2021-11-23 | Stop reason: SDUPTHER

## 2021-10-28 NOTE — PATIENT INSTRUCTIONS

## 2021-10-28 NOTE — PROGRESS NOTES
Problem list     Subjective   Ping Ivory is a 71 y.o. female     Chief Complaint   Patient presents with   • Follow-up   • Shortness of Breath     ?? COPD/ CT chest from ER 10/28/21 / lab work ( waiting on from [facility] )    Problem list:  1.  Coronary artery disease.  1.1.  Stenting of the LAD, 5/27/2021.  1.2.  Residual disease of 50% in the circumflex with recommendations to treat that medically.  2.  Preserved systolic function.  3.  Hypertension  4.  Dyslipidemia    HPI  The patient presents into the clinic today for routine follow-up.  She is referred back because of symptoms.  She has significant dyspnea, fatigue, and an overall sense of not feeling well at all.  She really has no chest pain but never really did even prior to stenting.  She tells me that she never had any improvement post stenting of the LAD.  She has been evaluated through pulmonology and is currently pending PFT evaluation.  Because of significant dyspnea recently, she was empirically treated with antibiotic therapy.  She never improved.  She was also treated with inhalers and nebulized therapy, again with no improvement noted in symptoms.  Symptoms do appear to be progressing particularly over the last 6 weeks.  She is concerned and would like repeat cardiac evaluation.  She presents today in that setting.    Current Outpatient Medications on File Prior to Visit   Medication Sig Dispense Refill   • albuterol (PROVENTIL) (5 MG/ML) 0.5% nebulizer solution Take 2.5 mg by nebulization Every 6 (Six) Hours As Needed for Wheezing.     • aspirin (aspirin) 81 MG EC tablet Take 1 tablet by mouth Daily. 30 tablet 5   • atenolol (TENORMIN) 25 MG tablet Take 1 tablet by mouth Daily. 90 tablet 3   • atorvastatin (LIPITOR) 40 MG tablet Take 1 tablet by mouth Daily. 90 tablet 3   • clopidogrel (PLAVIX) 75 MG tablet Take 1 tablet by mouth Daily. 90 tablet 3   • Fluticasone-Umeclidin-Vilant (Trelegy Ellipta) 100-62.5-25 MCG/INH inhaler Inhale 1  "puff Daily.     • losartan (Cozaar) 100 MG tablet Take 1 tablet by mouth Daily. 90 tablet 3     No current facility-administered medications on file prior to visit.       Ciprofloxacin hcl and Penicillins    Past Medical History:   Diagnosis Date   • Hypertension        Social History     Socioeconomic History   • Marital status:    Tobacco Use   • Smoking status: Never Smoker   • Smokeless tobacco: Never Used   Substance and Sexual Activity   • Alcohol use: Never   • Drug use: Never   • Sexual activity: Defer       Family History   Problem Relation Age of Onset   • Heart attack Mother    • Heart disease Mother    • Hypertension Mother    • Heart attack Father    • Heart disease Father    • Heart failure Father        Review of Systems   Constitutional: Positive for fatigue. Negative for chills and fever.   HENT: Negative for congestion, postnasal drip and sore throat.    Eyes: Positive for visual disturbance (glasses).   Respiratory: Positive for shortness of breath. Negative for chest tightness and wheezing.    Cardiovascular: Positive for leg swelling. Negative for chest pain and palpitations.   Gastrointestinal: Negative.    Endocrine: Negative.    Genitourinary: Negative.    Musculoskeletal: Negative.  Negative for arthralgias, back pain and neck pain.   Skin: Negative.  Negative for rash and wound.   Allergic/Immunologic: Positive for environmental allergies.   Neurological: Positive for headaches. Negative for dizziness, weakness and numbness.   Hematological: Bruises/bleeds easily (bruises/ bleeds).   Psychiatric/Behavioral: Negative.  Negative for sleep disturbance.       Objective   Vitals:    10/28/21 0837   BP: 172/92   BP Location: Left arm   Patient Position: Sitting   Pulse: 63   SpO2: 99%   Weight: 75.4 kg (166 lb 3.2 oz)   Height: 172.7 cm (68\")      /92 (BP Location: Left arm, Patient Position: Sitting)   Pulse 63   Ht 172.7 cm (68\")   Wt 75.4 kg (166 lb 3.2 oz)   SpO2 99%   BMI " 25.27 kg/m²    Lab Results (most recent)     None        Physical Exam  Vitals and nursing note reviewed.   Constitutional:       General: She is not in acute distress.     Appearance: She is well-developed.   HENT:      Head: Normocephalic and atraumatic.   Eyes:      Conjunctiva/sclera: Conjunctivae normal.      Pupils: Pupils are equal, round, and reactive to light.   Neck:      Vascular: No JVD.      Trachea: No tracheal deviation.   Cardiovascular:      Rate and Rhythm: Normal rate and regular rhythm.      Heart sounds: Normal heart sounds.   Pulmonary:      Effort: Pulmonary effort is normal.      Breath sounds: Normal breath sounds.   Abdominal:      General: Bowel sounds are normal. There is no distension.      Palpations: Abdomen is soft. There is no mass.      Tenderness: There is no abdominal tenderness. There is no guarding or rebound.   Musculoskeletal:         General: No tenderness or deformity. Normal range of motion.      Cervical back: Normal range of motion and neck supple.   Skin:     General: Skin is warm and dry.      Coloration: Skin is not pale.      Findings: No erythema or rash.   Neurological:      Mental Status: She is alert and oriented to person, place, and time.   Psychiatric:         Behavior: Behavior normal.         Thought Content: Thought content normal.         Judgment: Judgment normal.           Procedure   Procedures       Assessment/Plan      Diagnosis Plan   1. Coronary artery disease due to calcified coronary lesion     2. Essential hypertension     3. SOB (shortness of breath)     4. Fatigue, unspecified type       1.  The patient has ongoing dyspnea and fatigue, progressively worsening over the last 6 weeks.  She is being evaluated through pulmonology.  PFT evaluation has been scheduled at this time.  The patient has been empirically treated with antibiotic therapy, inhalers, nebulized therapies, with no improvement in symptoms whatsoever with those therapies.  Cardiac  evaluation has been requested.    2.  The patient has residual nonobstructive disease in the circumflex.  She had lateral wall ischemia by her stress test findings however.  I would like to have Dr. Moreira review cath films to see if he feels that this is potentially a target for intervention.    3.  In the interim, I would schedule the patient to start isosorbide 30 mg 1 tab daily just to empirically address symptoms.    4.  If symptoms do not improve, and he does not feel that the circumflex is a target for intervention, I would then consider repeating noninvasive cardiac testing.    5.  I will see her back in 1 week just to evaluate symptoms on isosorbide.  She will call back immediately for any recurrent symptoms prior to that.  Further pending clinical course and response to medication changes above.         Advance Care Planning   ACP discussion was held with the patient during this visit. Patient does not have an advance directive, declines further assistance.             Electronically signed by:

## 2021-11-02 DIAGNOSIS — I25.118 CORONARY ARTERY DISEASE OF NATIVE ARTERY OF NATIVE HEART WITH STABLE ANGINA PECTORIS (HCC): ICD-10-CM

## 2021-11-02 RX ORDER — ATORVASTATIN CALCIUM 40 MG/1
40 TABLET, FILM COATED ORAL DAILY
Qty: 90 TABLET | Refills: 3 | Status: SHIPPED | OUTPATIENT
Start: 2021-11-02 | End: 2022-08-30 | Stop reason: SDUPTHER

## 2021-11-04 ENCOUNTER — CLINICAL SUPPORT (OUTPATIENT)
Dept: CARDIOLOGY | Facility: CLINIC | Age: 71
End: 2021-11-04

## 2021-11-04 VITALS
BODY MASS INDEX: 25.27 KG/M2 | HEART RATE: 68 BPM | DIASTOLIC BLOOD PRESSURE: 90 MMHG | SYSTOLIC BLOOD PRESSURE: 159 MMHG | OXYGEN SATURATION: 98 % | WEIGHT: 166.2 LBS

## 2021-11-04 NOTE — PROGRESS NOTES
Pign Ivory  1950 11/4/2021   ?   No chief complaint on file.     ?   HPI:   ?   ? Patient was in the office today for a symptom check , she is S/P heart cath 5/27/21 ( 6 month ) she was recently started on isosorbide mononitrate 30 mg she momo and chest pain or chest tightness , her coughing has been suppressed since starting isosorbide she did state she is still having shortness of breath but it is no worse then it was 3- 6 months ago.   ?     Current Outpatient Medications:   •  albuterol (PROVENTIL) (5 MG/ML) 0.5% nebulizer solution, Take 2.5 mg by nebulization Every 6 (Six) Hours As Needed for Wheezing., Disp: , Rfl:   •  aspirin (aspirin) 81 MG EC tablet, Take 1 tablet by mouth Daily., Disp: 30 tablet, Rfl: 5  •  atenolol (TENORMIN) 25 MG tablet, Take 1 tablet by mouth Daily., Disp: 90 tablet, Rfl: 3  •  atorvastatin (LIPITOR) 40 MG tablet, Take 1 tablet by mouth Daily., Disp: 90 tablet, Rfl: 3  •  clopidogrel (PLAVIX) 75 MG tablet, Take 1 tablet by mouth Daily., Disp: 90 tablet, Rfl: 3  •  Fluticasone-Umeclidin-Vilant (Trelegy Ellipta) 100-62.5-25 MCG/INH inhaler, Inhale 1 puff Daily., Disp: , Rfl:   •  isosorbide mononitrate (IMDUR) 30 MG 24 hr tablet, Take 1 tablet by mouth Daily., Disp: 30 tablet, Rfl: 11  •  losartan (Cozaar) 100 MG tablet, Take 1 tablet by mouth Daily., Disp: 90 tablet, Rfl: 3   ?   ?   Ciprofloxacin hcl and Penicillins       Procedures     ?   Assessment/Plan    ?   ?   ?           1. V/O chart review / medication / cath report by Bill CORBETT                 2. Patient is to continue with current medication plan.                 3. She is to keep routine follow , but if symptoms change she is to call for sooner visit.     Patient verbalized understanding     AT [facility]

## 2021-11-11 ENCOUNTER — DOCUMENTATION (OUTPATIENT)
Dept: CARDIOLOGY | Facility: CLINIC | Age: 71
End: 2021-11-11

## 2021-11-11 NOTE — PROGRESS NOTES
DR. CHIU REVIEWED 5- [facility] FILMS PER POSSIBLE INTERVENTION PER EVELYN GONZALEZ'S REQUEST DUE TO PATIENT'S SX'S. PER DR. CHIU NOTHING IN THE R[facility]TTO FIX, TINY FIRST OBTUSE MARGINAL FIXING IT WOULD NOT HELP, TINY BRANCH < 2MM 70% , MEDICAL MANAGEMENT. RELAYED ABOVE TO EVELYN GONZALEZ, PAC. HE WANTED PATIENT CALLED TO NOTIFY OF ABOVE AND IF SX'S PERSIST OR WORSEN TO CONTACT THE OFFICE FOR MEDICATION ADJUSTMENT. CALLED AND RELAYED ABOVE TO PATIENT AND SHE STATES THAT THE IMDUR EVELYN PUT HER ON AT LAST FIRST HAS HELPED. EVELYN AWARE, VERBALIZED OK. PH,LPN

## 2021-11-23 ENCOUNTER — OFFICE VISIT (OUTPATIENT)
Dept: CARDIOLOGY | Facility: CLINIC | Age: 71
End: 2021-11-23

## 2021-11-23 VITALS
WEIGHT: 165.4 LBS | OXYGEN SATURATION: 99 % | HEART RATE: 64 BPM | HEIGHT: 68 IN | BODY MASS INDEX: 25.07 KG/M2 | DIASTOLIC BLOOD PRESSURE: 92 MMHG | SYSTOLIC BLOOD PRESSURE: 167 MMHG

## 2021-11-23 DIAGNOSIS — R06.02 SOB (SHORTNESS OF BREATH): ICD-10-CM

## 2021-11-23 DIAGNOSIS — I10 ESSENTIAL HYPERTENSION: ICD-10-CM

## 2021-11-23 DIAGNOSIS — I25.118 CORONARY ARTERY DISEASE OF NATIVE ARTERY OF NATIVE HEART WITH STABLE ANGINA PECTORIS (HCC): Primary | ICD-10-CM

## 2021-11-23 PROCEDURE — 93000 ELECTROCARDIOGRAM COMPLETE: CPT | Performed by: NURSE PRACTITIONER

## 2021-11-23 PROCEDURE — 99214 OFFICE O/P EST MOD 30 MIN: CPT | Performed by: NURSE PRACTITIONER

## 2021-11-23 RX ORDER — AMLODIPINE BESYLATE 5 MG/1
5 TABLET ORAL DAILY
Qty: 90 TABLET | Refills: 3 | Status: SHIPPED | OUTPATIENT
Start: 2021-11-23 | End: 2021-12-02 | Stop reason: SDUPTHER

## 2021-11-23 RX ORDER — ISOSORBIDE MONONITRATE 60 MG/1
60 TABLET, EXTENDED RELEASE ORAL EVERY MORNING
Qty: 90 TABLET | Refills: 3 | Status: SHIPPED | OUTPATIENT
Start: 2021-11-23 | End: 2022-09-26

## 2021-11-23 NOTE — PROGRESS NOTES
Subjective     Ping Ivory is a 71 y.o. female who presents to day for Shortness of Breath (presents for eval), Fatigue, and Hypertension.    CHIEF COMPLIANT  Chief Complaint   Patient presents with   • Shortness of Breath     presents for eval   • Fatigue   • Hypertension       Active Problems:  Problem List Items Addressed This Visit        Cardiac and Vasculature    Essential hypertension    Relevant Medications    amLODIPine (NORVASC) 5 MG tablet    Other Relevant Orders    ECG 12 Lead    Stress Test With Myocardial Perfusion One Day       Pulmonary and Pneumonias    SOB (shortness of breath)    Relevant Medications    isosorbide mononitrate (IMDUR) 60 MG 24 hr tablet    Other Relevant Orders    ECG 12 Lead    Stress Test With Myocardial Perfusion One Day      Other Visit Diagnoses     Coronary artery disease of native artery of native heart with stable angina pectoris (HCC)    -  Primary    Relevant Medications    amLODIPine (NORVASC) 5 MG tablet    isosorbide mononitrate (IMDUR) 60 MG 24 hr tablet    Other Relevant Orders    ECG 12 Lead    Stress Test With Myocardial Perfusion One Day        Problem list:  1.  Coronary artery disease.  1.1.  Stenting of the LAD, 5/27/2021.  1.2.  Residual disease of 50% in the circumflex with recommendations to treat that medically.  2.  Preserved systolic function.  3.  Hypertension  4.  Dyslipidemia    HPI  HPI  Ms. Ivory is a 71-year-old female patient who is being followed up today for worsening shortness of breath fatigue and hypertension.  Patient reports over the last 2 months that her shortness of breath has dramatically increased where even walking across the room makes her dyspneic.  She says she is avoids going into her normal entrance due to having to walk up 7 steps and she has been going into the front entrance.  She says that this is very same symptoms that she had prior to her stenting of the LAD and May 2021.  She did have residual 50% stenosis in the  circumflex at that time.  She has continued her aspirin and Plavix, high intensity statin therapy of atorvastatin, and previously was started on isosorbide 30 mg daily.  She does report about half of 45 days the isosorbide did help a little bit.  She is also been followed by pulmonology in which they told her that her lungs were clear and they felt it was more associated with her heart.  Her dyspnea usually occurs with activity and not really at rest.  However family reports that they noticed an increased effort in breathing when she is at rest.  This had went away for about 2 weeks after standing and now has returned.  She does have generalized weakness and fatigue were she reports that is also worsened over the last 2 months and she is still feel like doing anything she says she only one go to the town to visit family as she is so tired and short of breath.  She also has chronic arterial hypertension which she has been treated with atenolol and losartan.  Today her blood pressure is elevated 167/92 heart rate is 64.  She reports that her blood pressure has been running higher especially when she has been here.  She denies any chest pain, lower extremity edema, palpitations, dizziness, lightheadedness, syncope, PND, orthopnea, or strokelike symptoms.  We did review her left heart catheterization and her echocardiogram in detail and discussed other options.  PRIOR MEDS  Current Outpatient Medications on File Prior to Visit   Medication Sig Dispense Refill   • albuterol (PROVENTIL) (5 MG/ML) 0.5% nebulizer solution Take 2.5 mg by nebulization Every 6 (Six) Hours As Needed for Wheezing.     • aspirin (aspirin) 81 MG EC tablet Take 1 tablet by mouth Daily. 30 tablet 5   • atenolol (TENORMIN) 25 MG tablet Take 1 tablet by mouth Daily. 90 tablet 3   • atorvastatin (LIPITOR) 40 MG tablet Take 1 tablet by mouth Daily. 90 tablet 3   • clopidogrel (PLAVIX) 75 MG tablet Take 1 tablet by mouth Daily. 90 tablet 3   •  Fluticasone-Umeclidin-Vilant (Trelegy Ellipta) 100-62.5-25 MCG/INH inhaler Inhale 1 puff Daily.     • losartan (Cozaar) 100 MG tablet Take 1 tablet by mouth Daily. 90 tablet 3   • [DISCONTINUED] isosorbide mononitrate (IMDUR) 30 MG 24 hr tablet Take 1 tablet by mouth Daily. 30 tablet 11     No current facility-administered medications on file prior to visit.       ALLERGIES  Ciprofloxacin hcl and Penicillins    HISTORY  Past Medical History:   Diagnosis Date   • Hypertension        Social History     Socioeconomic History   • Marital status:    Tobacco Use   • Smoking status: Never Smoker   • Smokeless tobacco: Never Used   Substance and Sexual Activity   • Alcohol use: Never   • Drug use: Never   • Sexual activity: Defer       Family History   Problem Relation Age of Onset   • Heart attack Mother    • Heart disease Mother    • Hypertension Mother    • Heart attack Father    • Heart disease Father    • Heart failure Father        Review of Systems   Constitutional: Positive for fatigue (worse the last 2 months). Negative for diaphoresis and fever.   HENT: Negative.    Eyes: Negative.  Negative for visual disturbance.   Respiratory: Positive for cough and shortness of breath (with walking across a room). Negative for apnea, chest tightness and wheezing.         Sees Dr Masterson   Cardiovascular: Negative.  Negative for chest pain, palpitations and leg swelling.   Gastrointestinal: Negative.  Negative for abdominal pain, blood in stool, constipation, diarrhea, nausea and vomiting.   Endocrine: Negative.    Genitourinary: Negative.  Negative for hematuria.   Musculoskeletal: Negative.  Negative for arthralgias, back pain, myalgias and neck pain.   Skin: Negative.    Allergic/Immunologic: Negative.    Neurological: Positive for weakness. Negative for dizziness, syncope, light-headedness, numbness and headaches.   Hematological: Bruises/bleeds easily.   Psychiatric/Behavioral: Negative.  Negative for sleep  "disturbance.       Objective     VITALS: /92 (BP Location: Right arm, Patient Position: Sitting)   Pulse 64   Ht 172.7 cm (67.99\")   Wt 75 kg (165 lb 6.4 oz)   SpO2 99%   BMI 25.15 kg/m²     LABS:   Lab Results (most recent)     None          IMAGING:   No Images in the past 120 days found..    EXAM:  Physical Exam  Vitals and nursing note reviewed.   Constitutional:       Appearance: She is well-developed.   HENT:      Head: Normocephalic.   Eyes:      Pupils: Pupils are equal, round, and reactive to light.   Neck:      Thyroid: No thyroid mass.      Vascular: No carotid bruit or JVD.      Trachea: Trachea and phonation normal.   Cardiovascular:      Rate and Rhythm: Normal rate and regular rhythm.      Pulses:           Radial pulses are 2+ on the right side and 2+ on the left side.        Posterior tibial pulses are 2+ on the right side and 2+ on the left side.      Heart sounds: Normal heart sounds. No murmur heard.  No friction rub. No gallop.    Pulmonary:      Effort: Pulmonary effort is normal. No respiratory distress.      Breath sounds: Normal breath sounds. No wheezing or rales.   Abdominal:      General: Bowel sounds are normal.      Palpations: Abdomen is soft.   Musculoskeletal:         General: No swelling. Normal range of motion.      Cervical back: Neck supple.   Skin:     General: Skin is warm and dry.      Capillary Refill: Capillary refill takes less than 2 seconds.      Findings: No rash.   Neurological:      Mental Status: She is alert and oriented to person, place, and time.   Psychiatric:         Speech: Speech normal.         Behavior: Behavior normal.         Thought Content: Thought content normal.         Judgment: Judgment normal.         Procedure     ECG 12 Lead    Date/Time: 11/23/2021 9:29 AM  Performed by: Magen Foote APRN  Authorized by: Magen Foote APRN   Comparison: compared with previous ECG from 2/1/2021  Comparison to previous ECG: T wave inversion in lead " III and flattening in aVF  Rhythm: sinus rhythm  Rate: normal  BPM: 63  QRS axis: normal  Other findings: non-specific ST-T wave changes  Comments:  ms  No acute changes               Assessment/Plan    Diagnosis Plan   1. Coronary artery disease of native artery of native heart with stable angina pectoris (HCC)  ECG 12 Lead    isosorbide mononitrate (IMDUR) 60 MG 24 hr tablet    Stress Test With Myocardial Perfusion One Day   2. SOB (shortness of breath)  ECG 12 Lead    isosorbide mononitrate (IMDUR) 60 MG 24 hr tablet    Stress Test With Myocardial Perfusion One Day   3. Essential hypertension  ECG 12 Lead    amLODIPine (NORVASC) 5 MG tablet    Stress Test With Myocardial Perfusion One Day   One.  Patient does have a history of coronary artery disease when she received a stent to her left anterior descending and is on dual antiplatelet therapy of aspirin and Plavix since May of this year.  She is has return of the same symptoms that she had previous to stent placement.  She did have resolution of her symptoms and has returned over the last couple months.  Due to this I do feel it is appropriate to repeat a stress test.  2.  I would also like to increase her isosorbide to 60 mg daily as well as add amlodipine 5 mg daily.  This will intensify her antianginal therapy.  3.  Patient's blood pressure significant elevated today and I like that amlodipine 5 mg daily not only for antianginal therapy.  For hypertensive control.  She'll continue to monitor blood pressure on routine basis report any significant highs or lows.  I did request patient make sure they notify me of her blood pressure when she returns for her stress testing.  4.  Informed of signs and symptoms of ACS and advised to seek emergent treatment for any new worsening symptoms.  Patient also advised sooner follow-up as needed.  Also advised to follow-up with family doctor as needed  This note is dictated utilizing voice recognition software.   Although this record has been proof read, transcriptional errors may still be present. If questions occur regarding the content of this record please do not hesitate to call our office.  I have reviewed and confirmed the accuracy of the ROS as documented by the MA/LPN/RN MAYA Pruitt    No follow-ups on file.    Diagnoses and all orders for this visit:    1. Coronary artery disease of native artery of native heart with stable angina pectoris (HCC) (Primary)  -     ECG 12 Lead  -     Cancel: Stress Test With Myocardial Perfusion One Day; Future  -     isosorbide mononitrate (IMDUR) 60 MG 24 hr tablet; Take 1 tablet by mouth Every Morning.  Dispense: 90 tablet; Refill: 3  -     Stress Test With Myocardial Perfusion One Day; Future    2. SOB (shortness of breath)  -     ECG 12 Lead  -     Cancel: Stress Test With Myocardial Perfusion One Day; Future  -     isosorbide mononitrate (IMDUR) 60 MG 24 hr tablet; Take 1 tablet by mouth Every Morning.  Dispense: 90 tablet; Refill: 3  -     Stress Test With Myocardial Perfusion One Day; Future    3. Essential hypertension  -     ECG 12 Lead  -     Cancel: Stress Test With Myocardial Perfusion One Day; Future  -     amLODIPine (NORVASC) 5 MG tablet; Take 1 tablet by mouth Daily.  Dispense: 90 tablet; Refill: 3  -     Stress Test With Myocardial Perfusion One Day; Future        Advance Care Planning   ACP discussion was held with the patient during this visit. Patient does not have an advance directive, declines further assistance.       MEDS ORDERED DURING VISIT:  New Medications Ordered This Visit   Medications   • amLODIPine (NORVASC) 5 MG tablet     Sig: Take 1 tablet by mouth Daily.     Dispense:  90 tablet     Refill:  3   • isosorbide mononitrate (IMDUR) 60 MG 24 hr tablet     Sig: Take 1 tablet by mouth Every Morning.     Dispense:  90 tablet     Refill:  3           This document has been electronically signed by MAYA Pruitt Jr.  November 23, 2021 10:09  EST

## 2021-11-30 ENCOUNTER — HOSPITAL ENCOUNTER (OUTPATIENT)
Dept: CARDIOLOGY | Facility: HOSPITAL | Age: 71
Discharge: HOME | End: 2021-11-30

## 2021-11-30 DIAGNOSIS — I25.118 CORONARY ARTERY DISEASE OF NATIVE ARTERY OF NATIVE HEART WITH STABLE ANGINA PECTORIS (HCC): ICD-10-CM

## 2021-11-30 DIAGNOSIS — R06.02 SOB (SHORTNESS OF BREATH): ICD-10-CM

## 2021-11-30 DIAGNOSIS — I10 ESSENTIAL HYPERTENSION: ICD-10-CM

## 2021-11-30 LAB
BH CV REST NUCLEAR ISOTOPE DOSE: 10 MCI
BH CV STRESS COMMENTS STAGE 1: NORMAL
BH CV STRESS DOSE REGADENOSON STAGE 1: 0.4
BH CV STRESS DURATION MIN STAGE 1: 0
BH CV STRESS DURATION SEC STAGE 1: 10
BH CV STRESS NUCLEAR ISOTOPE DOSE: 30 MCI
BH CV STRESS PROTOCOL 1: NORMAL
BH CV STRESS RECOVERY BP: NORMAL MMHG
BH CV STRESS RECOVERY HR: 66 BPM
BH CV STRESS STAGE 1: 1
MAXIMAL PREDICTED HEART RATE: 147 BPM
PERCENT MAX PREDICTED HR: 70.75 %
STRESS BASELINE BP: NORMAL MMHG
STRESS BASELINE HR: 62 BPM
STRESS PERCENT HR: 83 %
STRESS POST PEAK BP: NORMAL MMHG
STRESS POST PEAK HR: 104 BPM
STRESS TARGET HR: 125 BPM

## 2021-11-30 PROCEDURE — A9500 TC99M SESTAMIBI: HCPCS | Performed by: INTERNAL MEDICINE

## 2021-11-30 PROCEDURE — 0 TECHNETIUM SESTAMIBI: Performed by: INTERNAL MEDICINE

## 2021-11-30 PROCEDURE — 93017 CV STRESS TEST TRACING ONLY: CPT

## 2021-11-30 PROCEDURE — 25010000002 REGADENOSON 0.4 MG/5ML SOLUTION: Performed by: INTERNAL MEDICINE

## 2021-11-30 PROCEDURE — 25010000002 AMINOPHYLLINE PER 250 MG: Performed by: INTERNAL MEDICINE

## 2021-11-30 PROCEDURE — 78452 HT MUSCLE IMAGE SPECT MULT: CPT | Performed by: INTERNAL MEDICINE

## 2021-11-30 PROCEDURE — 78452 HT MUSCLE IMAGE SPECT MULT: CPT

## 2021-11-30 PROCEDURE — 93018 CV STRESS TEST I&R ONLY: CPT | Performed by: INTERNAL MEDICINE

## 2021-11-30 RX ORDER — AMINOPHYLLINE DIHYDRATE 25 MG/ML
125 INJECTION, SOLUTION INTRAVENOUS
Status: COMPLETED | OUTPATIENT
Start: 2021-11-30 | End: 2021-11-30

## 2021-11-30 RX ADMIN — TECHNETIUM TC 99M SESTAMIBI 1 DOSE: 1 INJECTION INTRAVENOUS at 10:26

## 2021-11-30 RX ADMIN — REGADENOSON 0.4 MG: 0.08 INJECTION, SOLUTION INTRAVENOUS at 10:26

## 2021-11-30 RX ADMIN — AMINOPHYLLINE 125 MG: 25 INJECTION, SOLUTION INTRAVENOUS at 10:26

## 2021-11-30 RX ADMIN — TECHNETIUM TC 99M SESTAMIBI 1 DOSE: 1 INJECTION INTRAVENOUS at 08:33

## 2021-12-01 ENCOUNTER — TELEPHONE (OUTPATIENT)
Dept: CARDIOLOGY | Facility: CLINIC | Age: 71
End: 2021-12-01

## 2021-12-01 NOTE — TELEPHONE ENCOUNTER
Patient informed of results. Follow up scheduled tomorrow.     ----- Message from MAYA Pruitt sent at 12/1/2021  8:18 AM EST -----  Positive stress test.  2-week follow-up.  ----- Message -----  From: Russell Moreira MD  Sent: 11/30/2021   8:59 PM EST  To: MAYA Pruitt      Result Text  1.  A small, mild, inferolateral wall ischemic defect cannot be excluded on scintigraphic imaging.     2.  Preserved post-rest ejection fraction of 81% with no focal wall motion abnormalities.     3.  No evidence of pharmacologically induced transient ischemic dilation or of increased lung uptake of radiopharmaceutical.     Discussion: See nurses description of patient's symptoms during and immediately post infusion.

## 2021-12-02 ENCOUNTER — OFFICE VISIT (OUTPATIENT)
Dept: CARDIOLOGY | Facility: CLINIC | Age: 71
End: 2021-12-02

## 2021-12-02 VITALS
BODY MASS INDEX: 25.34 KG/M2 | DIASTOLIC BLOOD PRESSURE: 77 MMHG | OXYGEN SATURATION: 98 % | HEIGHT: 68 IN | HEART RATE: 75 BPM | WEIGHT: 167.2 LBS | SYSTOLIC BLOOD PRESSURE: 158 MMHG

## 2021-12-02 DIAGNOSIS — I25.10 CORONARY ARTERY DISEASE INVOLVING NATIVE CORONARY ARTERY OF NATIVE HEART WITHOUT ANGINA PECTORIS: ICD-10-CM

## 2021-12-02 DIAGNOSIS — I10 ESSENTIAL HYPERTENSION: Primary | ICD-10-CM

## 2021-12-02 DIAGNOSIS — R06.02 SHORTNESS OF BREATH: ICD-10-CM

## 2021-12-02 PROCEDURE — 99214 OFFICE O/P EST MOD 30 MIN: CPT | Performed by: NURSE PRACTITIONER

## 2021-12-02 RX ORDER — AMLODIPINE BESYLATE 10 MG/1
10 TABLET ORAL DAILY
Qty: 30 TABLET | Refills: 11 | Status: SHIPPED | OUTPATIENT
Start: 2021-12-02 | End: 2022-06-20

## 2021-12-02 NOTE — PATIENT INSTRUCTIONS
Acute Coronary Syndrome  Acute coronary syndrome (ACS) is a serious problem in which there is suddenly not enough blood and oxygen reaching the heart. ACS can result in chest pain or a heart attack.  This condition is a medical emergency. If you have any symptoms of this condition, get help right away.  What are the causes?  This condition may be caused by:  · A buildup of fat and cholesterol inside the arteries (atherosclerosis). This is the most common cause. The buildup (plaque) can cause blood vessels in the heart (coronary arteries) to become narrow or blocked, which reduces blood flow to the heart. Plaque can also break off and lead to a clot, which can block an artery and cause a heart attack or stroke.  · Sudden tightening of the muscles around the coronary arteries (coronary spasm).  · Tearing of a coronary artery (spontaneous coronary artery dissection).  · Very low blood pressure (hypotension).  · An abnormal heartbeat (arrhythmia).  · Other medical conditions that cause a decrease of oxygen to the heart, such as anemiaorrespiratory failure.  · Using cocaine or methamphetamine.  What increases the risk?  The following factors may make you more likely to develop this condition:  · Age. The risk for ACS increases as you get older.  · History of chest pain, heart attack, peripheral artery disease, or stroke.  · Having taken chemotherapy or immune-suppressing medicines.  · Being male.  · Family history of chest pain, heart disease, or stroke.  · Smoking.  · Not exercising enough.  · Being overweight.  · High cholesterol.  · High blood pressure (hypertension).  · Diabetes.  · Excessive alcohol use.  What are the signs or symptoms?  Common symptoms of this condition include:  · Chest pain. The pain may last a long time, or it may stop and come back (recur). It may feel like:  ? Crushing or squeezing.  ? Tightness, pressure, fullness, or heaviness.  · Arm, neck, jaw, or back pain.  · Heartburn or  indigestion.  · Shortness of breath.  · Nausea.  · Sudden cold sweats.  · Light-headedness.  · Dizziness or passing out.  · Tiredness (fatigue).  Sometimes there are no symptoms.  How is this diagnosed?  This condition may be diagnosed based on:  · Your medical history and symptoms.  · Imaging tests, such as:  ? An electrocardiogram (ECG). This measures the heart's electrical activity.  ? X-rays.  ? CT scan.  ? A coronary angiogram. For this test, dye is injected into the heart arteries and then X-rays are taken.  ? Myocardial perfusion imaging. This test shows how well blood flows through your heart muscle.  · Blood tests. These may be repeated at certain time intervals.  · Exercise stress testing.  · Echocardiogram. This is a test that uses sound waves to produce detailed images of the heart.  How is this treated?  Treatment for this condition may include:  · Oxygen therapy.  · Medicines, such as:  ? Antiplatelet medicines and blood-thinning medicines, such as aspirin. These help prevent blood clots.  ? Medicine that dissolves any blood clots (fibrinolytic therapy).  ? Blood pressure medicines.  ? Nitroglycerin. This helps widen blood vessels to improve blood flow.  ? Pain medicine.  ? Cholesterol-lowering medicine.  · Surgery, such as:  ? Coronary angioplasty with stent placement. This involves placing a small piece of metal that looks like mesh or a spring into a narrow coronary artery. This widens the artery and keeps it open.  ? Coronary artery bypass surgery. This involves taking a section of a blood vessel from a different part of your body and placing it on the blocked coronary artery to allow blood to flow around the blockage.  · Cardiac rehabilitation. This is a program that includes exercise training, education, and counseling to help you recover.  Follow these instructions at home:  Eating and drinking  · Eat a heart-healthy diet that includes whole grains, fruits and vegetables, lean proteins, and  low-fat or nonfat dairy products.  · Limit how much salt (sodium) you eat as told by your health care provider. Follow instructions from your health care provider about any other eating or drinking restrictions, such as limiting foods that are high in fat and processed sugars.  · Use healthy cooking methods such as roasting, grilling, broiling, baking, poaching, steaming, or stir-frying.  · Work with a dietitian to follow a heart-healthy eating plan.  Medicines  · Take over-the-counter and prescription medicines only as told by your health care provider.  · Do not take these medicines unless your health care provider approves:  ? Vitamin supplements that contain vitamin A or vitamin E.  ? NSAIDs, such as ibuprofen, naproxen, or celecoxib.  ? Hormone replacement therapy that contains estrogen.  · If you are taking blood thinners:  ? Talk with your health care provider before you take any medicines that contain aspirin or NSAIDs. These medicines increase your risk for dangerous bleeding.  ? Take your medicine exactly as told, at the same time every day.  ? Avoid activities that could cause injury or bruising, and follow instructions about how to prevent falls.  ? Wear a medical alert bracelet, and carry a card that lists what medicines you take.  Activity  · Follow your cardiac rehabilitation program. Do exercises as told by your physical therapist.  · Ask your health care provider what activities and exercises are safe for you. Follow his or her instructions about lifting, driving, or climbing stairs.  Lifestyle  · Do not use any products that contain nicotine or tobacco, such as cigarettes, e-cigarettes, and chewing tobacco. If you need help quitting, ask your health care provider.  · Do not drink alcohol if your health care provider tells you not to drink.  · If you drink alcohol:  ? Limit how much you have to 0-1 drink a day.  ? Be aware of how much alcohol is in your drink. In the [place] one drink equals one 12 oz  bottle of beer (355 mL), one 5 oz glass of wine (148 mL), or one 1½ oz glass of hard liquor (44 mL).  · Maintain a healthy weight. If you need to lose weight, work with your health care provider to do so safely.  General instructions  · Tell all the health care providers who provide care for you about your heart condition, including your dentist. This may affect the medicines or treatment you receive.  · Manage any other health conditions you have, such as hypertension or diabetes. These conditions affect your heart.  · Pay attention to your mental health. You may be at higher risk for depression.  ? Find ways to manage stress.  ? Talk to your health care provider about depression screening and treatment.  · Keep your vaccinations up to date.  ? Get the flu shot (influenza vaccine) every year.  ? Get the pneumococcal vaccine if you are age 65 or older.  · If directed, monitor your blood pressure at home.  · Keep all follow-up visits as told by your health care provider. This is important.  Contact a health care provider if you:  · Feel overwhelmed or sad.  · Have trouble doing your daily activities.  Get help right away if you:  · Have pain in your chest, neck, arm, jaw, stomach, or back that recurs, and:  ? It lasts for more than a few minutes.  ? It is not relieved by taking the medicineyour health care provider prescribed.  · Have unexplained:  ? Heavy sweating.  ? Heartburn or indigestion.  ? Nausea or vomiting.  ? Shortness of breath.  ? Difficulty breathing.  ? Fatigue.  ? Nervousness or anxiety.  ? Weakness.  ? Diarrhea.  ? Dark stools or blood in your stool.  · Have sudden light-headedness or dizziness.  · Have blood pressure that is higher than 180/120.  · Faint.  · Have thoughts about hurting yourself.  These symptoms may represent a serious problem that is an emergency. Do not wait to see if the symptoms will go away. Get medical help right away. Call your local emergency services (911 in the [place] Do  not drive yourself to the hospital.   Summary  · Acute coronary syndrome (ACS) is when there is not enough blood and oxygen being supplied to the heart. ACS can result in chest pain or a heart attack.  · Acute coronary syndrome is a medical emergency. If you have any symptoms of this condition, get help right away.  · Treatment includes medicines and procedures to open the blocked arteries and restore blood flow.  This information is not intended to replace advice given to you by your health care provider. Make sure you discuss any questions you have with your health care provider.  Document Revised: 05/20/2020 Document Reviewed: 12/30/2019  [employer] Patient Education © 2021 [employer]      Hypertension, Adult  Hypertension is another name for high blood pressure. High blood pressure forces your heart to work harder to pump blood. This can cause problems over time.  There are two numbers in a blood pressure reading. There is a top number (systolic) over a bottom number (diastolic). It is best to have a blood pressure that is below 120/80. Healthy choices can help lower your blood pressure, or you may need medicine to help lower it.  What are the causes?  The cause of this condition is not known. Some conditions may be related to high blood pressure.  What increases the risk?  · Smoking.  · Having type 2 diabetes mellitus, high cholesterol, or both.  · Not getting enough exercise or physical activity.  · Being overweight.  · Having too much fat, sugar, calories, or salt (sodium) in your diet.  · Drinking too much alcohol.  · Having long-term (chronic) kidney disease.  · Having a family history of high blood pressure.  · Age. Risk increases with age.  · Race. You may be at higher risk if you are .  · Gender. Men are at higher risk than women before age 45. After age 65, women are at higher risk than men.  · Having obstructive sleep apnea.  · Stress.  What are the signs or symptoms?  · High blood  pressure may not cause symptoms. Very high blood pressure (hypertensive crisis) may cause:  ? Headache.  ? Feelings of worry or nervousness (anxiety).  ? Shortness of breath.  ? Nosebleed.  ? A feeling of being sick to your stomach (nausea).  ? Throwing up (vomiting).  ? Changes in how you see.  ? Very bad chest pain.  ? Seizures.  How is this treated?  · This condition is treated by making healthy lifestyle changes, such as:  ? Eating healthy foods.  ? Exercising more.  ? Drinking less alcohol.  · Your health care provider may prescribe medicine if lifestyle changes are not enough to get your blood pressure under control, and if:  ? Your top number is above 130.  ? Your bottom number is above 80.  · Your personal target blood pressure may vary.  Follow these instructions at home:  Eating and drinking    · If told, follow the DASH eating plan. To follow this plan:  ? Fill one half of your plate at each meal with fruits and vegetables.  ? Fill one fourth of your plate at each meal with whole grains. Whole grains include whole-wheat pasta, brown rice, and whole-grain bread.  ? Eat or drink low-fat dairy products, such as skim milk or low-fat yogurt.  ? Fill one fourth of your plate at each meal with low-fat (lean) proteins. Low-fat proteins include fish, chicken without skin, eggs, beans, and tofu.  ? Avoid fatty meat, cured and processed meat, or chicken with skin.  ? Avoid pre-made or processed food.  · Eat less than 1,500 mg of salt each day.  · Do not drink alcohol if:  ? Your doctor tells you not to drink.  ? You are pregnant, may be pregnant, or are planning to become pregnant.  · If you drink alcohol:  ? Limit how much you use to:  § 0-1 drink a day for women.  § 0-2 drinks a day for men.  ? Be aware of how much alcohol is in your drink. In the [place] one drink equals one 12 oz bottle of beer (355 mL), one 5 oz glass of wine (148 mL), or one 1½ oz glass of hard liquor (44 mL).    Lifestyle    · Work with your  doctor to stay at a healthy weight or to lose weight. Ask your doctor what the best weight is for you.  · Get at least 30 minutes of exercise most days of the week. This may include walking, swimming, or biking.  · Get at least 30 minutes of exercise that strengthens your muscles (resistance exercise) at least 3 days a week. This may include lifting weights or doing Pilates.  · Do not use any products that contain nicotine or tobacco, such as cigarettes, e-cigarettes, and chewing tobacco. If you need help quitting, ask your doctor.  · Check your blood pressure at home as told by your doctor.  · Keep all follow-up visits as told by your doctor. This is important.    Medicines  · Take over-the-counter and prescription medicines only as told by your doctor. Follow directions carefully.  · Do not skip doses of blood pressure medicine. The medicine does not work as well if you skip doses. Skipping doses also puts you at risk for problems.  · Ask your doctor about side effects or reactions to medicines that you should watch for.  Contact a doctor if you:  · Think you are having a reaction to the medicine you are taking.  · Have headaches that keep coming back (recurring).  · Feel dizzy.  · Have swelling in your ankles.  · Have trouble with your vision.  Get help right away if you:  · Get a very bad headache.  · Start to feel mixed up (confused).  · Feel weak or numb.  · Feel faint.  · Have very bad pain in your:  ? Chest.  ? Belly (abdomen).  · Throw up more than once.  · Have trouble breathing.  Summary  · Hypertension is another name for high blood pressure.  · High blood pressure forces your heart to work harder to pump blood.  · For most people, a normal blood pressure is less than 120/80.  · Making healthy choices can help lower blood pressure. If your blood pressure does not get lower with healthy choices, you may need to take medicine.  This information is not intended to replace advice given to you by your health  care provider. Make sure you discuss any questions you have with your health care provider.  Document Revised: 08/28/2019 Document Reviewed: 08/28/2019  [employer] Patient Education © 2021 [employer]

## 2021-12-02 NOTE — PROGRESS NOTES
Subjective     Ping Ivory is a 71 y.o. female who presents to day for Hypertension (presents for abn stress) and Coronary Artery Disease.    CHIEF COMPLIANT  Chief Complaint   Patient presents with   • Hypertension     presents for abn stress   • Coronary Artery Disease       Active Problems:  Problem List Items Addressed This Visit        Cardiac and Vasculature    Essential hypertension - Primary    Relevant Medications    amLODIPine (NORVASC) 10 MG tablet      Other Visit Diagnoses     Coronary artery disease involving native coronary artery of native heart without angina pectoris        Relevant Medications    amLODIPine (NORVASC) 10 MG tablet    Shortness of breath            Problem list:  1.  Coronary artery disease.  1.1.  Stenting of the LAD, 5/27/2021.  1.2.  Residual disease of 50% in the circumflex with recommendations to treat that medically.  1.3 stress test 11/21: Small, mild inferior lateral wall ischemic defect cannot be excluded on sonographic, preserved post-rest EF of 81%  2.  Preserved systolic function.  3.  Hypertension  4.  Dyslipidemia    HPI  HPI  Ms. Ivory is a 71-year-old female patient who is being followed up today for coronary artery disease and chronic arterial hypertension.  Patient does have coronary artery disease in which on her previous visit she was having similar symptoms to what she was prior to stenting.  We did do a stress test that was positive with a small mild inferior lateral wall ischemia with a preserved post-rest ejection fraction 81%.  This is very similar to the stress test that she had prior to the left heart catheterization in which she did receive a stent to her left anterior descending.  Due to similarities of stress test in the circumflex only had a 50% stenosis we have decided to move forth with continuation of medication management at this time.  All of her symptoms have completely resolved and she says she has been feeling pretty good ever since the  isosorbide was increased to 60 mg daily.  She does have chronic arterial hypertension where she says her blood pressure does run a little high at home.  She is currently been treated with losartan, amlodipine, and atenolol.  Today her blood pressure is elevated 158/77 heart rate is 75.  Overall she reports that she is doing relatively well from the coronary artery standpoint and currently denies any angina anginal equivalent symptoms.  We did discuss if any of the symptoms returned that would move forth and have the circumflex IVUS.  PRIOR MEDS  Current Outpatient Medications on File Prior to Visit   Medication Sig Dispense Refill   • albuterol (PROVENTIL) (5 MG/ML) 0.5% nebulizer solution Take 2.5 mg by nebulization Every 6 (Six) Hours As Needed for Wheezing.     • aspirin (aspirin) 81 MG EC tablet Take 1 tablet by mouth Daily. 30 tablet 5   • atenolol (TENORMIN) 25 MG tablet Take 1 tablet by mouth Daily. 90 tablet 3   • atorvastatin (LIPITOR) 40 MG tablet Take 1 tablet by mouth Daily. 90 tablet 3   • clopidogrel (PLAVIX) 75 MG tablet Take 1 tablet by mouth Daily. 90 tablet 3   • isosorbide mononitrate (IMDUR) 60 MG 24 hr tablet Take 1 tablet by mouth Every Morning. 90 tablet 3   • losartan (Cozaar) 100 MG tablet Take 1 tablet by mouth Daily. 90 tablet 3   • [DISCONTINUED] amLODIPine (NORVASC) 5 MG tablet Take 1 tablet by mouth Daily. 90 tablet 3   • [DISCONTINUED] Fluticasone-Umeclidin-Vilant (Trelegy Ellipta) 100-62.5-25 MCG/INH inhaler Inhale 1 puff Daily.       No current facility-administered medications on file prior to visit.       ALLERGIES  Ciprofloxacin hcl and Penicillins    HISTORY  Past Medical History:   Diagnosis Date   • Hypertension        Social History     Socioeconomic History   • Marital status:    Tobacco Use   • Smoking status: Never Smoker   • Smokeless tobacco: Never Used   Substance and Sexual Activity   • Alcohol use: Never   • Drug use: Never   • Sexual activity: Defer  "      Family History   Problem Relation Age of Onset   • Heart attack Mother    • Heart disease Mother    • Hypertension Mother    • Heart attack Father    • Heart disease Father    • Heart failure Father        Review of Systems   Constitutional: Negative.  Negative for chills, diaphoresis, fatigue and fever.   HENT: Negative.    Eyes: Negative.  Negative for visual disturbance.   Respiratory: Negative.  Negative for apnea, cough, chest tightness, shortness of breath and wheezing.    Cardiovascular: Negative.  Negative for chest pain, palpitations and leg swelling.   Gastrointestinal: Negative.  Negative for abdominal pain, blood in stool, constipation, diarrhea, nausea and vomiting.   Endocrine: Negative.    Genitourinary: Negative.  Negative for hematuria.   Musculoskeletal: Negative.  Negative for arthralgias, back pain and myalgias.   Skin: Negative.    Allergic/Immunologic: Negative.    Neurological: Negative.  Negative for dizziness, syncope, weakness, light-headedness, numbness and headaches.   Hematological: Bruises/bleeds easily.   Psychiatric/Behavioral: Negative.  Negative for sleep disturbance.       Objective     VITALS: /77 (BP Location: Right arm, Patient Position: Sitting)   Pulse 75   Ht 172.7 cm (67.99\")   Wt 75.8 kg (167 lb 3.2 oz)   SpO2 98%   BMI 25.43 kg/m²     LABS:   Lab Results (most recent)     None          IMAGING:   No Images in the past 120 days found..    EXAM:  Physical Exam  Vitals and nursing note reviewed.   Constitutional:       Appearance: She is well-developed.   HENT:      Head: Normocephalic.   Eyes:      Pupils: Pupils are equal, round, and reactive to light.   Neck:      Thyroid: No thyroid mass.      Vascular: No carotid bruit or JVD.      Trachea: Trachea and phonation normal.   Cardiovascular:      Rate and Rhythm: Normal rate and regular rhythm.      Pulses:           Radial pulses are 2+ on the right side and 2+ on the left side.        Posterior tibial " pulses are 2+ on the right side and 2+ on the left side.      Heart sounds: Normal heart sounds. No murmur heard.  No friction rub. No gallop.    Pulmonary:      Effort: Pulmonary effort is normal. No respiratory distress.      Breath sounds: Normal breath sounds. No wheezing or rales.   Abdominal:      General: Bowel sounds are normal.      Palpations: Abdomen is soft.   Musculoskeletal:         General: Normal range of motion.      Cervical back: Neck supple.   Skin:     General: Skin is warm and dry.      Capillary Refill: Capillary refill takes less than 2 seconds.      Findings: No rash.   Neurological:      Mental Status: She is alert and oriented to person, place, and time.   Psychiatric:         Speech: Speech normal.         Behavior: Behavior normal.         Thought Content: Thought content normal.         Judgment: Judgment normal.         Procedure   Procedures       Assessment/Plan    Diagnosis Plan   1. Essential hypertension  amLODIPine (NORVASC) 10 MG tablet   2. Coronary artery disease involving native coronary artery of native heart without angina pectoris     3. Shortness of breath     1.  Patient's blood pressure is elevated today 158/77 heart rate is 75.  She also reports that her blood pressure is usually elevated at home as well.  We will increase her amlodipine to 10 mg daily to see if we can better control her blood pressure.  She will monitor her blood pressure on routine basis report any significant highs or lows.  2.  Patient does have a history of coronary artery disease in which she did receive a stent to her LAD previously.  We did repeat a stress test due to return of symptoms however after increasing isosorbide she has had no continuation of her symptoms.  Her stress test was indeterminate and could not rule out inferior lateral ischemia this is a same area that was positive on the previous stress test.  A joint decision at this time was made to continue to monitor and if symptoms  return we would consider IVUS of the circumflex for further evaluation of the remaining 50% stenosis.  3.  Patient's dyspnea is stable and nonprogressive and we will continue to monitor.  4.  Informed of signs and symptoms of ACS and advised to seek emergent treatment for any new worsening symptoms.  Patient also advised sooner follow-up as needed.  Also advised to follow-up with family doctor as needed  This note is dictated utilizing voice recognition software.  Although this record has been proof read, transcriptional errors may still be present. If questions occur regarding the content of this record please do not hesitate to call our office.  I have reviewed and confirmed the accuracy of the ROS as documented by the MA/LPN/RN MAYA Pruitt    Return in about 3 months (around 3/2/2022), or if symptoms worsen or fail to improve.    Diagnoses and all orders for this visit:    1. Essential hypertension (Primary)  -     amLODIPine (NORVASC) 10 MG tablet; Take 1 tablet by mouth Daily.  Dispense: 30 tablet; Refill: 11    2. Coronary artery disease involving native coronary artery of native heart without angina pectoris    3. Shortness of breath        Advance Care Planning   ACP discussion was held with the patient during this visit. Patient does not have an advance directive, declines further assistance.         MEDS ORDERED DURING VISIT:  New Medications Ordered This Visit   Medications   • amLODIPine (NORVASC) 10 MG tablet     Sig: Take 1 tablet by mouth Daily.     Dispense:  30 tablet     Refill:  11           This document has been electronically signed by MAYA Pruitt Jr.  December 2, 2021 10:16 EST

## 2021-12-20 ENCOUNTER — OFFICE VISIT (OUTPATIENT)
Dept: CARDIOLOGY | Facility: CLINIC | Age: 71
End: 2021-12-20

## 2021-12-20 VITALS
HEIGHT: 68 IN | WEIGHT: 166.2 LBS | DIASTOLIC BLOOD PRESSURE: 81 MMHG | BODY MASS INDEX: 25.19 KG/M2 | OXYGEN SATURATION: 100 % | HEART RATE: 77 BPM | SYSTOLIC BLOOD PRESSURE: 144 MMHG

## 2021-12-20 DIAGNOSIS — I10 ESSENTIAL HYPERTENSION: Primary | ICD-10-CM

## 2021-12-20 DIAGNOSIS — I25.10 CORONARY ARTERY DISEASE INVOLVING NATIVE CORONARY ARTERY OF NATIVE HEART WITHOUT ANGINA PECTORIS: ICD-10-CM

## 2021-12-20 DIAGNOSIS — M79.89 LEG SWELLING: ICD-10-CM

## 2021-12-20 DIAGNOSIS — I51.89 GRADE I DIASTOLIC DYSFUNCTION: ICD-10-CM

## 2021-12-20 PROCEDURE — 99214 OFFICE O/P EST MOD 30 MIN: CPT | Performed by: NURSE PRACTITIONER

## 2021-12-20 RX ORDER — POTASSIUM CHLORIDE 750 MG/1
10 TABLET, FILM COATED, EXTENDED RELEASE ORAL DAILY PRN
Qty: 90 TABLET | Refills: 0 | Status: SHIPPED | OUTPATIENT
Start: 2021-12-20

## 2021-12-20 RX ORDER — DILTIAZEM HYDROCHLORIDE 240 MG/1
240 CAPSULE, COATED, EXTENDED RELEASE ORAL DAILY
Qty: 90 CAPSULE | Refills: 3 | Status: SHIPPED | OUTPATIENT
Start: 2021-12-20 | End: 2022-09-26

## 2021-12-20 RX ORDER — FUROSEMIDE 20 MG/1
20 TABLET ORAL DAILY PRN
Qty: 90 TABLET | Refills: 0 | Status: SHIPPED | OUTPATIENT
Start: 2021-12-20

## 2021-12-20 NOTE — PROGRESS NOTES
Subjective     Ping Ivory is a 71 y.o. female who presents to day for Hypertension (here for one month f/u), Coronary Artery Disease, and Edema.    CHIEF COMPLIANT  Chief Complaint   Patient presents with   • Hypertension     here for one month f/u   • Coronary Artery Disease   • Edema       Active Problems:  Problem List Items Addressed This Visit        Cardiac and Vasculature    Essential hypertension - Primary    Relevant Medications    dilTIAZem CD (CARDIZEM CD) 240 MG 24 hr capsule    furosemide (LASIX) 20 MG tablet    potassium chloride 10 MEQ CR tablet      Other Visit Diagnoses     Leg swelling        Relevant Medications    furosemide (LASIX) 20 MG tablet    potassium chloride 10 MEQ CR tablet    Grade I diastolic dysfunction        Relevant Medications    furosemide (LASIX) 20 MG tablet    Coronary artery disease involving native coronary artery of native heart without angina pectoris        Relevant Medications    dilTIAZem CD (CARDIZEM CD) 240 MG 24 hr capsule        Problem list:  1.  Coronary artery disease.  1.1.  Stenting of the LAD, 5/27/2021.  1.2.  Residual disease of 50% in the circumflex with recommendations to treat that medically.  1.3 stress test 11/21: Small, mild inferior lateral wall ischemic defect cannot be excluded on sonographic, preserved post-rest EF of 81%  2.  Preserved systolic function.  3.  Hypertension  4.  Dyslipidemia    HPI  HPI  Ms. Ivory is a 71-year-old female patient who has a history of coronary artery disease when she had stenting of her LAD and May 2021, and chronic arterial hypertension.  Patient reports that she is done relatively well from the coronary artery standpoint.  She denies any angina anginal equivalent symptoms.  She is on antianginal therapy of isosorbide and dual antiplatelet therapy of aspirin and Plavix.  Patient is also on high intensity statin therapy of atorvastatin 40 mg daily.  She does have stable dyspnea that only occurs with activity.   This is unchanged nonprogressive from previous.  She also reports some lower extremity edema that mainly occurs around her ankles in which Lasix has helped.  In addition of this she does have a history of chronic arterial hypertension where her blood pressure is elevated at 144/81 heart rate of 77.  She is currently being treated with amlodipine, losartan, and atenolol.   She denies any chest pain, palpitations, fatigue, dizziness, lightheadedness, syncope, PND, orthopnea, or strokelike symptoms.  PRIOR MEDS  Current Outpatient Medications on File Prior to Visit   Medication Sig Dispense Refill   • albuterol (PROVENTIL) (5 MG/ML) 0.5% nebulizer solution Take 2.5 mg by nebulization Every 6 (Six) Hours As Needed for Wheezing.     • amLODIPine (NORVASC) 10 MG tablet Take 1 tablet by mouth Daily. 30 tablet 11   • aspirin (aspirin) 81 MG EC tablet Take 1 tablet by mouth Daily. 30 tablet 5   • atenolol (TENORMIN) 25 MG tablet Take 1 tablet by mouth Daily. 90 tablet 3   • atorvastatin (LIPITOR) 40 MG tablet Take 1 tablet by mouth Daily. 90 tablet 3   • clopidogrel (PLAVIX) 75 MG tablet Take 1 tablet by mouth Daily. 90 tablet 3   • isosorbide mononitrate (IMDUR) 60 MG 24 hr tablet Take 1 tablet by mouth Every Morning. 90 tablet 3   • losartan (Cozaar) 100 MG tablet Take 1 tablet by mouth Daily. 90 tablet 3     No current facility-administered medications on file prior to visit.       ALLERGIES  Ciprofloxacin hcl and Penicillins    HISTORY  Past Medical History:   Diagnosis Date   • Hypertension        Social History     Socioeconomic History   • Marital status:    Tobacco Use   • Smoking status: Never Smoker   • Smokeless tobacco: Never Used   Substance and Sexual Activity   • Alcohol use: Never   • Drug use: Never   • Sexual activity: Defer       Family History   Problem Relation Age of Onset   • Heart attack Mother    • Heart disease Mother    • Hypertension Mother    • Heart attack Father    • Heart disease  "Father    • Heart failure Father        Review of Systems   Constitutional: Negative.  Negative for chills, diaphoresis, fatigue and fever.   HENT: Negative.    Eyes: Negative.  Negative for visual disturbance.   Respiratory: Positive for shortness of breath ( with increased activity). Negative for apnea, cough, chest tightness and wheezing.    Cardiovascular: Positive for leg swelling (ankles). Negative for chest pain and palpitations.   Gastrointestinal: Negative.  Negative for abdominal pain, constipation, diarrhea, nausea and vomiting.   Endocrine: Negative.    Genitourinary: Negative.  Negative for hematuria.   Musculoskeletal: Negative.  Negative for arthralgias, back pain, myalgias and neck pain.   Allergic/Immunologic: Negative.    Neurological: Negative.  Negative for dizziness, syncope, weakness, light-headedness, numbness and headaches.   Hematological: Bruises/bleeds easily.   Psychiatric/Behavioral: Negative.  Negative for sleep disturbance.       Objective     VITALS: /81 (BP Location: Left arm, Patient Position: Sitting)   Pulse 77   Ht 172.7 cm (67.99\")   Wt 75.4 kg (166 lb 3.2 oz)   SpO2 100%   BMI 25.28 kg/m²     LABS:   Lab Results (most recent)     None          IMAGING:   No Images in the past 120 days found..    EXAM:  Physical Exam  Vitals and nursing note reviewed.   Constitutional:       Appearance: She is well-developed.   HENT:      Head: Normocephalic.   Eyes:      Pupils: Pupils are equal, round, and reactive to light.   Neck:      Thyroid: No thyroid mass.      Vascular: No carotid bruit or JVD.      Trachea: Trachea and phonation normal.   Cardiovascular:      Rate and Rhythm: Normal rate and regular rhythm.      Pulses:           Radial pulses are 2+ on the right side and 2+ on the left side.        Posterior tibial pulses are 2+ on the right side and 2+ on the left side.      Heart sounds: Normal heart sounds. No murmur heard.  No friction rub. No gallop.    Pulmonary:     "  Effort: Pulmonary effort is normal. No respiratory distress.      Breath sounds: Normal breath sounds. No wheezing or rales.   Abdominal:      General: Bowel sounds are normal.      Palpations: Abdomen is soft.   Musculoskeletal:         General: Swelling (trace) present. Normal range of motion.      Cervical back: Neck supple.   Skin:     General: Skin is warm and dry.      Capillary Refill: Capillary refill takes less than 2 seconds.      Findings: No rash.   Neurological:      Mental Status: She is alert and oriented to person, place, and time.   Psychiatric:         Speech: Speech normal.         Behavior: Behavior normal.         Thought Content: Thought content normal.         Judgment: Judgment normal.         Procedure   Procedures       Assessment/Plan    Diagnosis Plan   1. Essential hypertension  dilTIAZem CD (CARDIZEM CD) 240 MG 24 hr capsule    potassium chloride 10 MEQ CR tablet   2. Leg swelling  furosemide (LASIX) 20 MG tablet    potassium chloride 10 MEQ CR tablet   3. Grade I diastolic dysfunction  furosemide (LASIX) 20 MG tablet   4. Coronary artery disease involving native coronary artery of native heart without angina pectoris     1.  Patient's blood pressure is elevated today at 144/81 heart rate of 77.  We will switch her amlodipine to diltiazem due to swelling and hopefully better control her blood pressure.  Patient advised to monitor his blood pressure on a daily basis and report any significant highs or lows.  2.  Patient does report some lower extremity edema that is quite bad at times.  She does say it resolved with Lasix.  Patient advised to weigh themself daily and if there is a weight gain of 3 to 5 pounds overnight or 6 to 8 pounds in a week to take the Lasix and potassium.  If taking the Lasix more than 4 times a week will need to start taking it daily.  3.  Patient does have a history of coronary artery disease which seems to be well controlled on antianginal therapy in which she  previously received percutaneous coronary artery mention back in May.  She is on dual antiplatelet therapy, high intensity statin we will continue to monitor at this time.. Overall she states she is doing well from the cardiac standpoint.  4.  Informed of signs and symptoms of ACS and advised to seek emergent treatment for any new worsening symptoms.  Patient also advised sooner follow-up as needed.  Also advised to follow-up with family doctor as needed  This note is dictated utilizing voice recognition software.  Although this record has been proof read, transcriptional errors may still be present. If questions occur regarding the content of this record please do not hesitate to call our office.  I have reviewed and confirmed the accuracy of the ROS as documented by the MA/LPN/RN MAYA Pruitt    Return in about 6 months (around 6/20/2022), or if symptoms worsen or fail to improve.    Diagnoses and all orders for this visit:    1. Essential hypertension (Primary)  -     dilTIAZem CD (CARDIZEM CD) 240 MG 24 hr capsule; Take 1 capsule by mouth Daily.  Dispense: 90 capsule; Refill: 3  -     potassium chloride 10 MEQ CR tablet; Take 1 tablet by mouth Daily As Needed (with lasix).  Dispense: 90 tablet; Refill: 0    2. Leg swelling  -     furosemide (LASIX) 20 MG tablet; Take 1 tablet by mouth Daily As Needed (with increased swelling or weight gain 3 pounds overnight).  Dispense: 90 tablet; Refill: 0  -     potassium chloride 10 MEQ CR tablet; Take 1 tablet by mouth Daily As Needed (with lasix).  Dispense: 90 tablet; Refill: 0    3. Grade I diastolic dysfunction  -     furosemide (LASIX) 20 MG tablet; Take 1 tablet by mouth Daily As Needed (with increased swelling or weight gain 3 pounds overnight).  Dispense: 90 tablet; Refill: 0    4. Coronary artery disease involving native coronary artery of native heart without angina pectoris        Advance Care Planning   ACP discussion was held with the patient during  this visit. Patient does not have an advance directive, declines further assistance.       MEDS ORDERED DURING VISIT:  New Medications Ordered This Visit   Medications   • dilTIAZem CD (CARDIZEM CD) 240 MG 24 hr capsule     Sig: Take 1 capsule by mouth Daily.     Dispense:  90 capsule     Refill:  3   • furosemide (LASIX) 20 MG tablet     Sig: Take 1 tablet by mouth Daily As Needed (with increased swelling or weight gain 3 pounds overnight).     Dispense:  90 tablet     Refill:  0   • potassium chloride 10 MEQ CR tablet     Sig: Take 1 tablet by mouth Daily As Needed (with lasix).     Dispense:  90 tablet     Refill:  0           This document has been electronically signed by Magen Foote Jr., APRN  December 21, 2021 00:10 EST

## 2021-12-20 NOTE — PATIENT INSTRUCTIONS
Acute Coronary Syndrome  Acute coronary syndrome (ACS) is a serious problem in which there is suddenly not enough blood and oxygen reaching the heart. ACS can result in chest pain or a heart attack.  This condition is a medical emergency. If you have any symptoms of this condition, get help right away.  What are the causes?  This condition may be caused by:  · A buildup of fat and cholesterol inside the arteries (atherosclerosis). This is the most common cause. The buildup (plaque) can cause blood vessels in the heart (coronary arteries) to become narrow or blocked, which reduces blood flow to the heart. Plaque can also break off and lead to a clot, which can block an artery and cause a heart attack or stroke.  · Sudden tightening of the muscles around the coronary arteries (coronary spasm).  · Tearing of a coronary artery (spontaneous coronary artery dissection).  · Very low blood pressure (hypotension).  · An abnormal heartbeat (arrhythmia).  · Other medical conditions that cause a decrease of oxygen to the heart, such as anemiaorrespiratory failure.  · Using cocaine or methamphetamine.  What increases the risk?  The following factors may make you more likely to develop this condition:  · Age. The risk for ACS increases as you get older.  · History of chest pain, heart attack, peripheral artery disease, or stroke.  · Having taken chemotherapy or immune-suppressing medicines.  · Being male.  · Family history of chest pain, heart disease, or stroke.  · Smoking.  · Not exercising enough.  · Being overweight.  · High cholesterol.  · High blood pressure (hypertension).  · Diabetes.  · Excessive alcohol use.  What are the signs or symptoms?  Common symptoms of this condition include:  · Chest pain. The pain may last a long time, or it may stop and come back (recur). It may feel like:  ? Crushing or squeezing.  ? Tightness, pressure, fullness, or heaviness.  · Arm, neck, jaw, or back pain.  · Heartburn or  indigestion.  · Shortness of breath.  · Nausea.  · Sudden cold sweats.  · Light-headedness.  · Dizziness or passing out.  · Tiredness (fatigue).  Sometimes there are no symptoms.  How is this diagnosed?  This condition may be diagnosed based on:  · Your medical history and symptoms.  · Imaging tests, such as:  ? An electrocardiogram (ECG). This measures the heart's electrical activity.  ? X-rays.  ? CT scan.  ? A coronary angiogram. For this test, dye is injected into the heart arteries and then X-rays are taken.  ? Myocardial perfusion imaging. This test shows how well blood flows through your heart muscle.  · Blood tests. These may be repeated at certain time intervals.  · Exercise stress testing.  · Echocardiogram. This is a test that uses sound waves to produce detailed images of the heart.  How is this treated?  Treatment for this condition may include:  · Oxygen therapy.  · Medicines, such as:  ? Antiplatelet medicines and blood-thinning medicines, such as aspirin. These help prevent blood clots.  ? Medicine that dissolves any blood clots (fibrinolytic therapy).  ? Blood pressure medicines.  ? Nitroglycerin. This helps widen blood vessels to improve blood flow.  ? Pain medicine.  ? Cholesterol-lowering medicine.  · Surgery, such as:  ? Coronary angioplasty with stent placement. This involves placing a small piece of metal that looks like mesh or a spring into a narrow coronary artery. This widens the artery and keeps it open.  ? Coronary artery bypass surgery. This involves taking a section of a blood vessel from a different part of your body and placing it on the blocked coronary artery to allow blood to flow around the blockage.  · Cardiac rehabilitation. This is a program that includes exercise training, education, and counseling to help you recover.  Follow these instructions at home:  Eating and drinking  · Eat a heart-healthy diet that includes whole grains, fruits and vegetables, lean proteins, and  low-fat or nonfat dairy products.  · Limit how much salt (sodium) you eat as told by your health care provider. Follow instructions from your health care provider about any other eating or drinking restrictions, such as limiting foods that are high in fat and processed sugars.  · Use healthy cooking methods such as roasting, grilling, broiling, baking, poaching, steaming, or stir-frying.  · Work with a dietitian to follow a heart-healthy eating plan.  Medicines  · Take over-the-counter and prescription medicines only as told by your health care provider.  · Do not take these medicines unless your health care provider approves:  ? Vitamin supplements that contain vitamin A or vitamin E.  ? NSAIDs, such as ibuprofen, naproxen, or celecoxib.  ? Hormone replacement therapy that contains estrogen.  · If you are taking blood thinners:  ? Talk with your health care provider before you take any medicines that contain aspirin or NSAIDs. These medicines increase your risk for dangerous bleeding.  ? Take your medicine exactly as told, at the same time every day.  ? Avoid activities that could cause injury or bruising, and follow instructions about how to prevent falls.  ? Wear a medical alert bracelet, and carry a card that lists what medicines you take.  Activity  · Follow your cardiac rehabilitation program. Do exercises as told by your physical therapist.  · Ask your health care provider what activities and exercises are safe for you. Follow his or her instructions about lifting, driving, or climbing stairs.  Lifestyle  · Do not use any products that contain nicotine or tobacco, such as cigarettes, e-cigarettes, and chewing tobacco. If you need help quitting, ask your health care provider.  · Do not drink alcohol if your health care provider tells you not to drink.  · If you drink alcohol:  ? Limit how much you have to 0-1 drink a day.  ? Be aware of how much alcohol is in your drink. In the [place] one drink equals one 12 oz  bottle of beer (355 mL), one 5 oz glass of wine (148 mL), or one 1½ oz glass of hard liquor (44 mL).  · Maintain a healthy weight. If you need to lose weight, work with your health care provider to do so safely.  General instructions  · Tell all the health care providers who provide care for you about your heart condition, including your dentist. This may affect the medicines or treatment you receive.  · Manage any other health conditions you have, such as hypertension or diabetes. These conditions affect your heart.  · Pay attention to your mental health. You may be at higher risk for depression.  ? Find ways to manage stress.  ? Talk to your health care provider about depression screening and treatment.  · Keep your vaccinations up to date.  ? Get the flu shot (influenza vaccine) every year.  ? Get the pneumococcal vaccine if you are age 65 or older.  · If directed, monitor your blood pressure at home.  · Keep all follow-up visits as told by your health care provider. This is important.  Contact a health care provider if you:  · Feel overwhelmed or sad.  · Have trouble doing your daily activities.  Get help right away if you:  · Have pain in your chest, neck, arm, jaw, stomach, or back that recurs, and:  ? It lasts for more than a few minutes.  ? It is not relieved by taking the medicineyour health care provider prescribed.  · Have unexplained:  ? Heavy sweating.  ? Heartburn or indigestion.  ? Nausea or vomiting.  ? Shortness of breath.  ? Difficulty breathing.  ? Fatigue.  ? Nervousness or anxiety.  ? Weakness.  ? Diarrhea.  ? Dark stools or blood in your stool.  · Have sudden light-headedness or dizziness.  · Have blood pressure that is higher than 180/120.  · Faint.  · Have thoughts about hurting yourself.  These symptoms may represent a serious problem that is an emergency. Do not wait to see if the symptoms will go away. Get medical help right away. Call your local emergency services (911 in the [place] Do  not drive yourself to the hospital.   Summary  · Acute coronary syndrome (ACS) is when there is not enough blood and oxygen being supplied to the heart. ACS can result in chest pain or a heart attack.  · Acute coronary syndrome is a medical emergency. If you have any symptoms of this condition, get help right away.  · Treatment includes medicines and procedures to open the blocked arteries and restore blood flow.  This information is not intended to replace advice given to you by your health care provider. Make sure you discuss any questions you have with your health care provider.  Document Revised: 05/20/2020 Document Reviewed: 12/30/2019  [employer] Patient Education © 2021 [employer]      Hypertension, Adult  Hypertension is another name for high blood pressure. High blood pressure forces your heart to work harder to pump blood. This can cause problems over time.  There are two numbers in a blood pressure reading. There is a top number (systolic) over a bottom number (diastolic). It is best to have a blood pressure that is below 120/80. Healthy choices can help lower your blood pressure, or you may need medicine to help lower it.  What are the causes?  The cause of this condition is not known. Some conditions may be related to high blood pressure.  What increases the risk?  · Smoking.  · Having type 2 diabetes mellitus, high cholesterol, or both.  · Not getting enough exercise or physical activity.  · Being overweight.  · Having too much fat, sugar, calories, or salt (sodium) in your diet.  · Drinking too much alcohol.  · Having long-term (chronic) kidney disease.  · Having a family history of high blood pressure.  · Age. Risk increases with age.  · Race. You may be at higher risk if you are .  · Gender. Men are at higher risk than women before age 45. After age 65, women are at higher risk than men.  · Having obstructive sleep apnea.  · Stress.  What are the signs or symptoms?  · High blood  pressure may not cause symptoms. Very high blood pressure (hypertensive crisis) may cause:  ? Headache.  ? Feelings of worry or nervousness (anxiety).  ? Shortness of breath.  ? Nosebleed.  ? A feeling of being sick to your stomach (nausea).  ? Throwing up (vomiting).  ? Changes in how you see.  ? Very bad chest pain.  ? Seizures.  How is this treated?  · This condition is treated by making healthy lifestyle changes, such as:  ? Eating healthy foods.  ? Exercising more.  ? Drinking less alcohol.  · Your health care provider may prescribe medicine if lifestyle changes are not enough to get your blood pressure under control, and if:  ? Your top number is above 130.  ? Your bottom number is above 80.  · Your personal target blood pressure may vary.  Follow these instructions at home:  Eating and drinking    · If told, follow the DASH eating plan. To follow this plan:  ? Fill one half of your plate at each meal with fruits and vegetables.  ? Fill one fourth of your plate at each meal with whole grains. Whole grains include whole-wheat pasta, brown rice, and whole-grain bread.  ? Eat or drink low-fat dairy products, such as skim milk or low-fat yogurt.  ? Fill one fourth of your plate at each meal with low-fat (lean) proteins. Low-fat proteins include fish, chicken without skin, eggs, beans, and tofu.  ? Avoid fatty meat, cured and processed meat, or chicken with skin.  ? Avoid pre-made or processed food.  · Eat less than 1,500 mg of salt each day.  · Do not drink alcohol if:  ? Your doctor tells you not to drink.  ? You are pregnant, may be pregnant, or are planning to become pregnant.  · If you drink alcohol:  ? Limit how much you use to:  § 0-1 drink a day for women.  § 0-2 drinks a day for men.  ? Be aware of how much alcohol is in your drink. In the [place] one drink equals one 12 oz bottle of beer (355 mL), one 5 oz glass of wine (148 mL), or one 1½ oz glass of hard liquor (44 mL).    Lifestyle    · Work with your  doctor to stay at a healthy weight or to lose weight. Ask your doctor what the best weight is for you.  · Get at least 30 minutes of exercise most days of the week. This may include walking, swimming, or biking.  · Get at least 30 minutes of exercise that strengthens your muscles (resistance exercise) at least 3 days a week. This may include lifting weights or doing Pilates.  · Do not use any products that contain nicotine or tobacco, such as cigarettes, e-cigarettes, and chewing tobacco. If you need help quitting, ask your doctor.  · Check your blood pressure at home as told by your doctor.  · Keep all follow-up visits as told by your doctor. This is important.    Medicines  · Take over-the-counter and prescription medicines only as told by your doctor. Follow directions carefully.  · Do not skip doses of blood pressure medicine. The medicine does not work as well if you skip doses. Skipping doses also puts you at risk for problems.  · Ask your doctor about side effects or reactions to medicines that you should watch for.  Contact a doctor if you:  · Think you are having a reaction to the medicine you are taking.  · Have headaches that keep coming back (recurring).  · Feel dizzy.  · Have swelling in your ankles.  · Have trouble with your vision.  Get help right away if you:  · Get a very bad headache.  · Start to feel mixed up (confused).  · Feel weak or numb.  · Feel faint.  · Have very bad pain in your:  ? Chest.  ? Belly (abdomen).  · Throw up more than once.  · Have trouble breathing.  Summary  · Hypertension is another name for high blood pressure.  · High blood pressure forces your heart to work harder to pump blood.  · For most people, a normal blood pressure is less than 120/80.  · Making healthy choices can help lower blood pressure. If your blood pressure does not get lower with healthy choices, you may need to take medicine.  This information is not intended to replace advice given to you by your health  care provider. Make sure you discuss any questions you have with your health care provider.  Document Revised: 08/28/2019 Document Reviewed: 08/28/2019  [employer] Patient Education © 2021 [employer]

## 2022-04-03 DIAGNOSIS — I10 ESSENTIAL HYPERTENSION: Primary | ICD-10-CM

## 2022-04-04 RX ORDER — LOSARTAN POTASSIUM 100 MG/1
TABLET ORAL
Qty: 90 TABLET | Refills: 3 | Status: SHIPPED | OUTPATIENT
Start: 2022-04-04 | End: 2022-06-20 | Stop reason: SDUPTHER

## 2022-06-20 ENCOUNTER — OFFICE VISIT (OUTPATIENT)
Dept: CARDIOLOGY | Facility: CLINIC | Age: 72
End: 2022-06-20

## 2022-06-20 VITALS
HEART RATE: 54 BPM | OXYGEN SATURATION: 98 % | SYSTOLIC BLOOD PRESSURE: 152 MMHG | BODY MASS INDEX: 24.67 KG/M2 | DIASTOLIC BLOOD PRESSURE: 80 MMHG | WEIGHT: 162.2 LBS

## 2022-06-20 DIAGNOSIS — K59.01 SLOW TRANSIT CONSTIPATION: ICD-10-CM

## 2022-06-20 DIAGNOSIS — I10 ESSENTIAL HYPERTENSION: Primary | ICD-10-CM

## 2022-06-20 PROCEDURE — 99214 OFFICE O/P EST MOD 30 MIN: CPT | Performed by: NURSE PRACTITIONER

## 2022-06-20 RX ORDER — DOCUSATE SODIUM 100 MG/1
100 CAPSULE, LIQUID FILLED ORAL 2 TIMES DAILY PRN
Qty: 90 CAPSULE | Refills: 3 | Status: SHIPPED | OUTPATIENT
Start: 2022-06-20

## 2022-06-20 RX ORDER — ATENOLOL 25 MG/1
25 TABLET ORAL DAILY
Qty: 90 TABLET | Refills: 3 | Status: SHIPPED | OUTPATIENT
Start: 2022-06-20 | End: 2023-03-27 | Stop reason: SDUPTHER

## 2022-06-20 RX ORDER — LOSARTAN POTASSIUM AND HYDROCHLOROTHIAZIDE 25; 100 MG/1; MG/1
1 TABLET ORAL DAILY
Qty: 90 TABLET | Refills: 3 | Status: SHIPPED | OUTPATIENT
Start: 2022-06-20 | End: 2022-09-06 | Stop reason: SDUPTHER

## 2022-06-20 RX ORDER — DOCUSATE SODIUM 100 MG/1
100 CAPSULE, LIQUID FILLED ORAL 2 TIMES DAILY PRN
Qty: 60 CAPSULE | Refills: 6 | Status: SHIPPED | OUTPATIENT
Start: 2022-06-20 | End: 2022-06-20

## 2022-06-20 NOTE — PROGRESS NOTES
Subjective     Ping Ivory is a 71 y.o. female who presents to day for 6 month follow up and Hypertension.    CHIEF COMPLIANT  Chief Complaint   Patient presents with   • 6 month follow up   • Hypertension       Active Problems:  Problem List Items Addressed This Visit        Cardiac and Vasculature    Essential hypertension - Primary    Relevant Medications    atenolol (TENORMIN) 25 MG tablet    losartan-hydrochlorothiazide (Hyzaar) 100-25 MG per tablet      Other Visit Diagnoses     Slow transit constipation        Relevant Medications    docusate sodium (Colace) 100 MG capsule      Problem list:  1.  Coronary artery disease.  1.1.  Stenting of the LAD, 5/27/2021.  1.2.  Residual disease of 50% in the circumflex with recommendations to treat that medically.  1.3 stress test 11/21: Small, mild inferior lateral wall ischemic defect cannot be excluded on sonographic, preserved post-rest EF of 81%  2.  Preserved systolic function.  3.  Hypertension  4.  Dyslipidemia    HPI  HPI  Ms. Babcock is a 71-year-old female patient who is being followed up today for chronic arterial hypertension and history of coronary artery disease.  Patient does have chronic arterial hypertension in which she is currently being treated with losartan, diltiazem, and atenolol.  Today her blood pressure is elevated 152/80 heart rate of 54.  She does report predominantly her blood pressure is usually within normal range at home.  Always elevated when she comes to the doctor's office.  She does have a history of coronary artery disease in which she did have stent placement of the left anterior descending in May 2021.  She tolerated this well.  She is on aspirin and Plavix.  She is also on antianginal therapy including diltiazem and isosorbide.  She denies any chest pain, shortness of breath, lower extremity edema, palpitations, fatigue, PND, orthopnea, or strokelike symptoms.  She is going to be following up with Dr. Galloway in August for further  evaluation of her symptoms.              PRIOR MEDS  Current Outpatient Medications on File Prior to Visit   Medication Sig Dispense Refill   • albuterol (PROVENTIL) (5 MG/ML) 0.5% nebulizer solution Take 2.5 mg by nebulization Every 6 (Six) Hours As Needed for Wheezing.     • aspirin (aspirin) 81 MG EC tablet Take 1 tablet by mouth Daily. 30 tablet 5   • atorvastatin (LIPITOR) 40 MG tablet Take 1 tablet by mouth Daily. 90 tablet 3   • clopidogrel (PLAVIX) 75 MG tablet Take 1 tablet by mouth Daily. 90 tablet 3   • dilTIAZem CD (CARDIZEM CD) 240 MG 24 hr capsule Take 1 capsule by mouth Daily. (Patient taking differently: Take 60 mg by mouth Daily.) 90 capsule 3   • furosemide (LASIX) 20 MG tablet Take 1 tablet by mouth Daily As Needed (with increased swelling or weight gain 3 pounds overnight). 90 tablet 0   • isosorbide mononitrate (IMDUR) 60 MG 24 hr tablet Take 1 tablet by mouth Every Morning. 90 tablet 3   • potassium chloride 10 MEQ CR tablet Take 1 tablet by mouth Daily As Needed (with lasix). 90 tablet 0     No current facility-administered medications on file prior to visit.       ALLERGIES  Ciprofloxacin hcl, Keflex [cephalexin], and Penicillins    HISTORY  Past Medical History:   Diagnosis Date   • Hypertension        Social History     Socioeconomic History   • Marital status:    Tobacco Use   • Smoking status: Never Smoker   • Smokeless tobacco: Never Used   Substance and Sexual Activity   • Alcohol use: Never   • Drug use: Never   • Sexual activity: Defer       Family History   Problem Relation Age of Onset   • Heart attack Mother    • Heart disease Mother    • Hypertension Mother    • Heart attack Father    • Heart disease Father    • Heart failure Father        Review of Systems   Constitutional: Negative for chills, fatigue and fever.   HENT: Negative for congestion, rhinorrhea and sore throat.    Respiratory: Negative for chest tightness and shortness of breath.    Cardiovascular: Negative  for chest pain, palpitations and leg swelling.   Gastrointestinal: Positive for constipation. Negative for diarrhea and nausea.   Musculoskeletal: Negative for arthralgias, back pain and neck pain.   Allergic/Immunologic: Positive for environmental allergies and food allergies (eggs).   Neurological: Negative for dizziness, syncope, weakness and light-headedness.   Hematological: Does not bruise/bleed easily.   Psychiatric/Behavioral: Negative for sleep disturbance.       Objective     VITALS: /80 (BP Location: Left arm, Patient Position: Sitting)   Pulse 54   Wt 73.6 kg (162 lb 3.2 oz)   SpO2 98%   BMI 24.67 kg/m²     LABS:   Lab Results (most recent)     None          IMAGING:   No Images in the past 120 days found..    EXAM:  Physical Exam  Vitals and nursing note reviewed.   Constitutional:       Appearance: She is well-developed.   HENT:      Head: Normocephalic.   Eyes:      Pupils: Pupils are equal, round, and reactive to light.   Neck:      Thyroid: No thyroid mass.      Vascular: No carotid bruit or JVD.      Trachea: Trachea and phonation normal.   Cardiovascular:      Rate and Rhythm: Normal rate and regular rhythm.      Pulses:           Radial pulses are 2+ on the right side and 2+ on the left side.        Posterior tibial pulses are 2+ on the right side and 2+ on the left side.      Heart sounds: Normal heart sounds. No murmur heard.    No friction rub. No gallop.   Pulmonary:      Effort: Pulmonary effort is normal. No respiratory distress.      Breath sounds: Normal breath sounds. No wheezing or rales.   Abdominal:      General: Bowel sounds are normal.      Palpations: Abdomen is soft.   Musculoskeletal:         General: No swelling. Normal range of motion.      Cervical back: Neck supple.   Skin:     General: Skin is warm and dry.      Capillary Refill: Capillary refill takes less than 2 seconds.      Findings: No rash.   Neurological:      Mental Status: She is alert and oriented to  person, place, and time.   Psychiatric:         Speech: Speech normal.         Behavior: Behavior normal.         Thought Content: Thought content normal.         Judgment: Judgment normal.         Procedure   Procedures       Assessment & Plan    Diagnosis Plan   1. Essential hypertension  atenolol (TENORMIN) 25 MG tablet    losartan-hydrochlorothiazide (Hyzaar) 100-25 MG per tablet   2. Slow transit constipation  docusate sodium (Colace) 100 MG capsule   1.  Patient's blood pressure is elevated today at 152/80 with a heart rate of 54.  We will add hydrochlorothiazide to her losartan to see if we can better troll her blood pressure.  She will monitor her blood pressure on a routine basis and report any significant highs or lows.  2.  Patient does report significant constipation where she has not had a bowel movement recently.  I would like to start patient on Colace until she can follow-up with Dr. Galloway in August for further evaluation of this problem.  3.  Informed of signs and symptoms of ACS and advised to seek emergent treatment for any new worsening symptoms.  Patient also advised sooner follow-up as needed.  Also advised to follow-up with family doctor as needed  This note is dictated utilizing voice recognition software.  Although this record has been proof read, transcriptional errors may still be present. If questions occur regarding the content of this record please do not hesitate to call our office.  I have reviewed and confirmed the accuracy of the ROS as documented by the MA/LPN/RN MAYA Pruitt    Return in about 6 months (around 12/20/2022), or if symptoms worsen or fail to improve.    Diagnoses and all orders for this visit:    1. Essential hypertension (Primary)  -     atenolol (TENORMIN) 25 MG tablet; Take 1 tablet by mouth Daily.  Dispense: 90 tablet; Refill: 3  -     losartan-hydrochlorothiazide (Hyzaar) 100-25 MG per tablet; Take 1 tablet by mouth Daily.  Dispense: 90 tablet; Refill:  3    2. Slow transit constipation  -     Discontinue: docusate sodium (Colace) 100 MG capsule; Take 1 capsule by mouth 2 (Two) Times a Day As Needed for Constipation.  Dispense: 60 capsule; Refill: 6  -     docusate sodium (Colace) 100 MG capsule; Take 1 capsule by mouth 2 (Two) Times a Day As Needed for Constipation.  Dispense: 90 capsule; Refill: 3        Ping Ivory  reports that she has never smoked. She has never used smokeless tobacco.. I have educated her on the risk of diseases from using tobacco products .      Advance Care Planning   ACP discussion was held with the patient during this visit. Patient has an advance directive (not in EMR), copy requested.       MEDS ORDERED DURING VISIT:  New Medications Ordered This Visit   Medications   • atenolol (TENORMIN) 25 MG tablet     Sig: Take 1 tablet by mouth Daily.     Dispense:  90 tablet     Refill:  3   • docusate sodium (Colace) 100 MG capsule     Sig: Take 1 capsule by mouth 2 (Two) Times a Day As Needed for Constipation.     Dispense:  90 capsule     Refill:  3   • losartan-hydrochlorothiazide (Hyzaar) 100-25 MG per tablet     Sig: Take 1 tablet by mouth Daily.     Dispense:  90 tablet     Refill:  3           This document has been electronically signed by Magen Foote Jr., APRN  June 25, 2022 13:51 EDT

## 2022-08-05 ENCOUNTER — TELEPHONE (OUTPATIENT)
Dept: CARDIOLOGY | Facility: CLINIC | Age: 72
End: 2022-08-05

## 2022-08-30 DIAGNOSIS — I25.118 CORONARY ARTERY DISEASE OF NATIVE ARTERY OF NATIVE HEART WITH STABLE ANGINA PECTORIS: ICD-10-CM

## 2022-08-30 RX ORDER — ATORVASTATIN CALCIUM 40 MG/1
40 TABLET, FILM COATED ORAL DAILY
Qty: 90 TABLET | Refills: 3 | Status: SHIPPED | OUTPATIENT
Start: 2022-08-30 | End: 2023-03-27 | Stop reason: SDUPTHER

## 2022-09-06 ENCOUNTER — TELEPHONE (OUTPATIENT)
Dept: CARDIOLOGY | Facility: CLINIC | Age: 72
End: 2022-09-06

## 2022-09-06 RX ORDER — LOSARTAN POTASSIUM AND HYDROCHLOROTHIAZIDE 12.5; 5 MG/1; MG/1
1 TABLET ORAL DAILY
Qty: 30 TABLET | Refills: 6 | Status: SHIPPED | OUTPATIENT
Start: 2022-09-06 | End: 2022-09-26

## 2022-09-06 NOTE — TELEPHONE ENCOUNTER
Patient called and states she had covid x 3 weeks, and has not been able to get over it (Coughing a lot still)Took BP x 2 weeks prior to covid, and it was okay. After taking BP, now it is dropping low x1 hour after taking Losartan-HCT 86/53 and 89/48 but will go up a little bit after. She states BP is currently ranging 113/62 at this time.     I advised patient to monitor BP, any new or worsening to go to ER as I will speak with JR and call back with recommendations.    Patient verbally understands.

## 2022-09-06 NOTE — TELEPHONE ENCOUNTER
Have her decrease her losartan/hydrochlorothiazide to 50-12.5.  Continue monitoring blood pressure just ensure patient does not abruptly stop her beta-blocker therapy neck steps would be to decrease her calcium channel blocker.

## 2022-09-07 ENCOUNTER — TELEPHONE (OUTPATIENT)
Dept: CARDIOLOGY | Facility: CLINIC | Age: 72
End: 2022-09-07

## 2022-09-07 NOTE — TELEPHONE ENCOUNTER
Cardiac clearance requeste received from [facility]  for a Colons[facility] scheduled for 9/21/22.  Scanned to clearance folder and placed in Aide's box.

## 2022-09-26 ENCOUNTER — OFFICE VISIT (OUTPATIENT)
Dept: CARDIOLOGY | Facility: CLINIC | Age: 72
End: 2022-09-26

## 2022-09-26 VITALS
HEART RATE: 87 BPM | DIASTOLIC BLOOD PRESSURE: 89 MMHG | OXYGEN SATURATION: 99 % | WEIGHT: 154 LBS | SYSTOLIC BLOOD PRESSURE: 165 MMHG | HEIGHT: 68 IN | BODY MASS INDEX: 23.34 KG/M2

## 2022-09-26 DIAGNOSIS — I10 ESSENTIAL HYPERTENSION: Primary | ICD-10-CM

## 2022-09-26 DIAGNOSIS — R06.02 SHORTNESS OF BREATH: ICD-10-CM

## 2022-09-26 PROCEDURE — 93000 ELECTROCARDIOGRAM COMPLETE: CPT | Performed by: NURSE PRACTITIONER

## 2022-09-26 PROCEDURE — 99214 OFFICE O/P EST MOD 30 MIN: CPT | Performed by: NURSE PRACTITIONER

## 2022-09-26 RX ORDER — HYDROXYZINE PAMOATE 50 MG/1
50 CAPSULE ORAL NIGHTLY PRN
Qty: 30 CAPSULE | Refills: 0 | Status: SHIPPED | OUTPATIENT
Start: 2022-09-26 | End: 2023-03-27

## 2022-09-26 NOTE — PROGRESS NOTES
Subjective     Ping Ivory is a 72 y.o. female who presents to day for Hypertension (Has had trouble with pressure being to low has held some medication ), 3 month follow up, and post covid.    CHIEF COMPLIANT  Chief Complaint   Patient presents with   • Hypertension     Has had trouble with pressure being to low has held some medication    • 3 month follow up   • post covid       Active Problems:  Problem List Items Addressed This Visit        Cardiac and Vasculature    Essential hypertension - Primary      Other Visit Diagnoses     Shortness of breath          Problem list:  1.  Coronary artery disease.  1.1.  Stenting of the LAD, 5/27/2021.  1.2.  Residual disease of 50% in the circumflex with recommendations to treat that medically.  1.3 stress test 11/21: Small, mild inferior lateral wall ischemic defect cannot be excluded on sonographic, preserved post-rest EF of 81%  2.  Preserved systolic function.  3.  Hypertension  4.  Dyslipidemia    HPI  Ms. Ivory presents today for hypertension. The patient has been experiencing issues with lower blood pressure and has held several of her medications. Today, her blood pressure is elevated at 165/89 mmHg and her heart rate is 87 bpm. She is currently on atenolol and losartan-hydrochlorothiazide. The patient provided a blood pressure readings log, which showed her systolic blood pressure had dropped to 90 mmHg. Her blood pressure predominantly ranged from 120's to 140's mmHg. The patient's blood pressure has been 150's to 160's in the mornings since she adjusted her medication, but is usually 120's in the afternoon.The patient is currently not taking her blood pressure medications. She mentions that her heart rate is elevated since she stopped taking the medication.     The patient reports that she has had a cough since 08/12/2022 and she had a positive COVID-19 test on 08/13/2022.     She states that her shortness of breath is not worse since she had COVID-19. The  patient notes that she gets short of breath with exertion.     She is currently still taking her atorvastatin, aspirin and Plavix daily.  The patient states that she does not need to take the Lasix. She reports having lower extremity edema while driving.     The patient denies chest pain, syncope, and palpitations.   PRIOR MEDS  Current Outpatient Medications on File Prior to Visit   Medication Sig Dispense Refill   • aspirin (aspirin) 81 MG EC tablet Take 1 tablet by mouth Daily. 30 tablet 5   • atenolol (TENORMIN) 25 MG tablet Take 1 tablet by mouth Daily. 90 tablet 3   • atorvastatin (LIPITOR) 40 MG tablet Take 1 tablet by mouth Daily. 90 tablet 3   • clopidogrel (PLAVIX) 75 MG tablet Take 1 tablet by mouth Daily. 90 tablet 3   • docusate sodium (Colace) 100 MG capsule Take 1 capsule by mouth 2 (Two) Times a Day As Needed for Constipation. 90 capsule 3   • furosemide (LASIX) 20 MG tablet Take 1 tablet by mouth Daily As Needed (with increased swelling or weight gain 3 pounds overnight). 90 tablet 0   • potassium chloride 10 MEQ CR tablet Take 1 tablet by mouth Daily As Needed (with lasix). 90 tablet 0     No current facility-administered medications on file prior to visit.       ALLERGIES  Ciprofloxacin hcl, Keflex [cephalexin], and Penicillins    HISTORY  Past Medical History:   Diagnosis Date   • COVID 08/13/2022   • Hypertension        Social History     Socioeconomic History   • Marital status:    Tobacco Use   • Smoking status: Never Smoker   • Smokeless tobacco: Never Used   Substance and Sexual Activity   • Alcohol use: Never   • Drug use: Never   • Sexual activity: Defer       Family History   Problem Relation Age of Onset   • Heart attack Mother    • Heart disease Mother    • Hypertension Mother    • Heart attack Father    • Heart disease Father    • Heart failure Father        Review of Systems   Constitutional: Negative for chills and fatigue.   HENT: Negative for congestion (is getting horse),  "rhinorrhea and sore throat.    Respiratory: Positive for shortness of breath. Negative for chest tightness.    Cardiovascular: Negative for chest pain, palpitations and leg swelling.   Gastrointestinal: Negative for constipation, diarrhea and nausea.   Musculoskeletal: Negative for arthralgias, back pain and neck pain.   Allergic/Immunologic: Positive for environmental allergies and food allergies (eggs).   Neurological: Negative for dizziness, syncope, weakness and light-headedness.   Hematological: Bruises/bleeds easily (bruise).   Psychiatric/Behavioral: Positive for sleep disturbance (not pulmonary related).       Objective     VITALS: /89 (BP Location: Right arm, Patient Position: Sitting)   Pulse 87   Ht 172.7 cm (67.99\")   Wt 69.9 kg (154 lb)   SpO2 99%   BMI 23.42 kg/m²     LABS:   Lab Results (most recent)     None          IMAGING:   No Images in the past 120 days found..    EXAM:  Physical Exam  Vitals and nursing note reviewed.   Constitutional:       Appearance: She is well-developed.   HENT:      Head: Normocephalic.   Eyes:      Pupils: Pupils are equal, round, and reactive to light.   Neck:      Thyroid: No thyroid mass.      Vascular: No carotid bruit or JVD.      Trachea: Trachea and phonation normal.   Cardiovascular:      Rate and Rhythm: Normal rate and regular rhythm.      Pulses:           Radial pulses are 2+ on the right side and 2+ on the left side.        Posterior tibial pulses are 2+ on the right side and 2+ on the left side.      Heart sounds: Normal heart sounds. No murmur heard.    No friction rub. No gallop.   Pulmonary:      Effort: Pulmonary effort is normal. No respiratory distress.      Breath sounds: Normal breath sounds. No wheezing or rales.   Abdominal:      General: Bowel sounds are normal.      Palpations: Abdomen is soft.   Musculoskeletal:         General: No swelling. Normal range of motion.      Cervical back: Neck supple.   Skin:     General: Skin is warm " and dry.      Capillary Refill: Capillary refill takes less than 2 seconds.      Findings: No rash.   Neurological:      Mental Status: She is alert and oriented to person, place, and time.   Psychiatric:         Speech: Speech normal.         Behavior: Behavior normal.         Thought Content: Thought content normal.         Judgment: Judgment normal.         Procedure     ECG 12 Lead    Date/Time: 9/26/2022 2:45 PM  Performed by: Magen Foote APRN  Authorized by: Magen Foote APRN   Comparison: compared with previous ECG from 11/23/2021  Similar to previous ECG  Rhythm: sinus rhythm  Rate: normal  BPM: 73  QRS axis: normal  Other findings: non-specific ST-T wave changes  Comments:  ms  No acute changes               Assessment & Plan    Diagnosis Plan   1. Essential hypertension     2. Shortness of breath       Plan:  1. The patient has experienced a drop in her blood pressure. She is currently not taking her blood pressure medications. The patient will restart atenolol and monitor her blood pressure on a blood pressure reading log. She will call my office in 2 weeks to advise on her readings.  2.  Patient's shortness of breath is stable and nonprogressive.  She seems to be doing well from the cardiovascular standpoint.  We will continue to monitor at this time.  3.  Informed of signs and symptoms of ACS and advised to seek emergent treatment for any new worsening symptoms.  Patient also advised sooner follow-up as needed.  Also advised to follow-up with family doctor as needed  This note is dictated utilizing voice recognition software.  Although this record has been proof read, transcriptional errors may still be present. If questions occur regarding the content of this record please do not hesitate to call our office.  I have reviewed and confirmed the accuracy of the ROS as documented by the MA/LPN/RN MAYA Pruitt    Assessment:  1. Hypertension   Return in about 6 months (around  3/26/2023), or if symptoms worsen or fail to improve.    Diagnoses and all orders for this visit:    1. Essential hypertension (Primary)    2. Shortness of breath    Other orders  -     ECG 12 Lead  -     hydrOXYzine pamoate (Vistaril) 50 MG capsule; Take 1 capsule by mouth At Night As Needed (insomnia).  Dispense: 30 capsule; Refill: 0        Ping Ivory  reports that she has never smoked. She has never used smokeless tobacco.. I have educated her on the risk of diseases from using tobacco products.     Advance Care Planning   ACP discussion was held with the patient during this visit. Patient does not have an advance directive, declines further assistance.          MEDS ORDERED DURING VISIT:  New Medications Ordered This Visit   Medications   • hydrOXYzine pamoate (Vistaril) 50 MG capsule     Sig: Take 1 capsule by mouth At Night As Needed (insomnia).     Dispense:  30 capsule     Refill:  0     Transcribed from ambient dictation for MAYA Pruitt by Camila Hollis.  09/26/22   17:07 EDT    Patient verbalized consent to the visit recording.  I have personally performed the services described in this document as transcribed by the above individual, and it is both accurate and complete.        This document has been electronically signed by MAYA Pruitt Jr.  September 28, 2022 08:35 EDT

## 2022-10-10 DIAGNOSIS — I25.10 CORONARY ARTERY DISEASE DUE TO CALCIFIED CORONARY LESION: ICD-10-CM

## 2022-10-10 DIAGNOSIS — I25.84 CORONARY ARTERY DISEASE DUE TO CALCIFIED CORONARY LESION: ICD-10-CM

## 2022-10-11 RX ORDER — CLOPIDOGREL BISULFATE 75 MG/1
TABLET ORAL
Qty: 90 TABLET | Refills: 3 | Status: SHIPPED | OUTPATIENT
Start: 2022-10-11 | End: 2023-03-27 | Stop reason: SDUPTHER

## 2023-03-27 ENCOUNTER — LAB (OUTPATIENT)
Dept: CARDIOLOGY | Facility: CLINIC | Age: 73
End: 2023-03-27
Payer: MEDICARE

## 2023-03-27 ENCOUNTER — OFFICE VISIT (OUTPATIENT)
Dept: CARDIOLOGY | Facility: CLINIC | Age: 73
End: 2023-03-27
Payer: MEDICARE

## 2023-03-27 VITALS
HEART RATE: 61 BPM | DIASTOLIC BLOOD PRESSURE: 86 MMHG | HEIGHT: 68 IN | OXYGEN SATURATION: 97 % | BODY MASS INDEX: 24.1 KG/M2 | WEIGHT: 159 LBS | SYSTOLIC BLOOD PRESSURE: 161 MMHG

## 2023-03-27 DIAGNOSIS — G93.32 POST-COVID CHRONIC FATIGUE: ICD-10-CM

## 2023-03-27 DIAGNOSIS — I25.118 CORONARY ARTERY DISEASE OF NATIVE ARTERY OF NATIVE HEART WITH STABLE ANGINA PECTORIS: ICD-10-CM

## 2023-03-27 DIAGNOSIS — I25.10 CORONARY ARTERY DISEASE DUE TO CALCIFIED CORONARY LESION: ICD-10-CM

## 2023-03-27 DIAGNOSIS — R53.81 MALAISE AND FATIGUE: ICD-10-CM

## 2023-03-27 DIAGNOSIS — R53.83 MALAISE AND FATIGUE: ICD-10-CM

## 2023-03-27 DIAGNOSIS — U09.9 POST-COVID CHRONIC FATIGUE: ICD-10-CM

## 2023-03-27 DIAGNOSIS — I25.84 CORONARY ARTERY DISEASE DUE TO CALCIFIED CORONARY LESION: ICD-10-CM

## 2023-03-27 DIAGNOSIS — I10 ESSENTIAL HYPERTENSION: Primary | ICD-10-CM

## 2023-03-27 DIAGNOSIS — I10 ESSENTIAL HYPERTENSION: ICD-10-CM

## 2023-03-27 LAB
ALBUMIN SERPL-MCNC: 4.3 G/DL (ref 3.5–5.2)
ALBUMIN/GLOB SERPL: 1.7 G/DL
ALP SERPL-CCNC: 58 U/L (ref 39–117)
ALT SERPL W P-5'-P-CCNC: 26 U/L (ref 1–33)
ANION GAP SERPL CALCULATED.3IONS-SCNC: 11.4 MMOL/L (ref 5–15)
AST SERPL-CCNC: 49 U/L (ref 1–32)
BASOPHILS # BLD AUTO: 0.02 10*3/MM3 (ref 0–0.2)
BASOPHILS NFR BLD AUTO: 0.4 % (ref 0–1.5)
BILIRUB SERPL-MCNC: 0.4 MG/DL (ref 0–1.2)
BUN SERPL-MCNC: 11 MG/DL (ref 8–23)
BUN/CREAT SERPL: 12 (ref 7–25)
CALCIUM SPEC-SCNC: 9.5 MG/DL (ref 8.6–10.5)
CHLORIDE SERPL-SCNC: 106 MMOL/L (ref 98–107)
CHOLEST SERPL-MCNC: 116 MG/DL (ref 0–200)
CO2 SERPL-SCNC: 24.6 MMOL/L (ref 22–29)
CREAT SERPL-MCNC: 0.92 MG/DL (ref 0.57–1)
DEPRECATED RDW RBC AUTO: 49.1 FL (ref 37–54)
EGFRCR SERPLBLD CKD-EPI 2021: 65.5 ML/MIN/1.73
EOSINOPHIL # BLD AUTO: 0.09 10*3/MM3 (ref 0–0.4)
EOSINOPHIL NFR BLD AUTO: 1.6 % (ref 0.3–6.2)
ERYTHROCYTE [DISTWIDTH] IN BLOOD BY AUTOMATED COUNT: 14 % (ref 12.3–15.4)
GLOBULIN UR ELPH-MCNC: 2.6 GM/DL
GLUCOSE SERPL-MCNC: 86 MG/DL (ref 65–99)
HCT VFR BLD AUTO: 39 % (ref 34–46.6)
HDLC SERPL-MCNC: 64 MG/DL (ref 40–60)
HGB BLD-MCNC: 12.4 G/DL (ref 12–15.9)
IMM GRANULOCYTES # BLD AUTO: 0.02 10*3/MM3 (ref 0–0.05)
IMM GRANULOCYTES NFR BLD AUTO: 0.4 % (ref 0–0.5)
LDLC SERPL CALC-MCNC: 40 MG/DL (ref 0–100)
LDLC/HDLC SERPL: 0.66 {RATIO}
LYMPHOCYTES # BLD AUTO: 1.85 10*3/MM3 (ref 0.7–3.1)
LYMPHOCYTES NFR BLD AUTO: 32.5 % (ref 19.6–45.3)
MAGNESIUM SERPL-MCNC: 2 MG/DL (ref 1.6–2.4)
MCH RBC QN AUTO: 30.2 PG (ref 26.6–33)
MCHC RBC AUTO-ENTMCNC: 31.8 G/DL (ref 31.5–35.7)
MCV RBC AUTO: 95.1 FL (ref 79–97)
MONOCYTES # BLD AUTO: 0.42 10*3/MM3 (ref 0.1–0.9)
MONOCYTES NFR BLD AUTO: 7.4 % (ref 5–12)
NEUTROPHILS NFR BLD AUTO: 3.29 10*3/MM3 (ref 1.7–7)
NEUTROPHILS NFR BLD AUTO: 57.7 % (ref 42.7–76)
NRBC BLD AUTO-RTO: 0 /100 WBC (ref 0–0.2)
PLATELET # BLD AUTO: 187 10*3/MM3 (ref 140–450)
PMV BLD AUTO: 11.3 FL (ref 6–12)
POTASSIUM SERPL-SCNC: 4.7 MMOL/L (ref 3.5–5.2)
PROT SERPL-MCNC: 6.9 G/DL (ref 6–8.5)
RBC # BLD AUTO: 4.1 10*6/MM3 (ref 3.77–5.28)
SODIUM SERPL-SCNC: 142 MMOL/L (ref 136–145)
TRIGL SERPL-MCNC: 50 MG/DL (ref 0–150)
TSH SERPL DL<=0.05 MIU/L-ACNC: 4.17 UIU/ML (ref 0.27–4.2)
VLDLC SERPL-MCNC: 12 MG/DL (ref 5–40)
WBC NRBC COR # BLD: 5.69 10*3/MM3 (ref 3.4–10.8)

## 2023-03-27 PROCEDURE — 85025 COMPLETE CBC W/AUTO DIFF WBC: CPT | Performed by: NURSE PRACTITIONER

## 2023-03-27 PROCEDURE — 80061 LIPID PANEL: CPT | Performed by: NURSE PRACTITIONER

## 2023-03-27 PROCEDURE — 80053 COMPREHEN METABOLIC PANEL: CPT | Performed by: NURSE PRACTITIONER

## 2023-03-27 PROCEDURE — 82652 VIT D 1 25-DIHYDROXY: CPT | Performed by: NURSE PRACTITIONER

## 2023-03-27 PROCEDURE — 82607 VITAMIN B-12: CPT | Performed by: NURSE PRACTITIONER

## 2023-03-27 PROCEDURE — 82746 ASSAY OF FOLIC ACID SERUM: CPT | Performed by: NURSE PRACTITIONER

## 2023-03-27 PROCEDURE — 3079F DIAST BP 80-89 MM HG: CPT | Performed by: NURSE PRACTITIONER

## 2023-03-27 PROCEDURE — 1159F MED LIST DOCD IN RCRD: CPT | Performed by: NURSE PRACTITIONER

## 2023-03-27 PROCEDURE — 99214 OFFICE O/P EST MOD 30 MIN: CPT | Performed by: NURSE PRACTITIONER

## 2023-03-27 PROCEDURE — 3077F SYST BP >= 140 MM HG: CPT | Performed by: NURSE PRACTITIONER

## 2023-03-27 PROCEDURE — 84443 ASSAY THYROID STIM HORMONE: CPT | Performed by: NURSE PRACTITIONER

## 2023-03-27 PROCEDURE — 1160F RVW MEDS BY RX/DR IN RCRD: CPT | Performed by: NURSE PRACTITIONER

## 2023-03-27 PROCEDURE — 83735 ASSAY OF MAGNESIUM: CPT | Performed by: NURSE PRACTITIONER

## 2023-03-27 RX ORDER — ATORVASTATIN CALCIUM 40 MG/1
40 TABLET, FILM COATED ORAL DAILY
Qty: 90 TABLET | Refills: 3 | Status: SHIPPED | OUTPATIENT
Start: 2023-03-27

## 2023-03-27 RX ORDER — ATENOLOL 25 MG/1
25 TABLET ORAL DAILY
Qty: 90 TABLET | Refills: 3 | Status: SHIPPED | OUTPATIENT
Start: 2023-03-27

## 2023-03-27 RX ORDER — HYDROXYZINE PAMOATE 50 MG/1
50 CAPSULE ORAL NIGHTLY PRN
Qty: 90 CAPSULE | Refills: 0 | Status: SHIPPED | OUTPATIENT
Start: 2023-03-27

## 2023-03-27 RX ORDER — HYDROXYZINE PAMOATE 50 MG/1
50 CAPSULE ORAL NIGHTLY PRN
Qty: 90 CAPSULE | Refills: 0 | Status: SHIPPED | OUTPATIENT
Start: 2023-03-27 | End: 2023-03-27

## 2023-03-27 NOTE — PROGRESS NOTES
Subjective     Ping Ivory is a 72 y.o. female who presents to day for 6 month follow up  (Patient states she just does not feel well over all.) and Hypertension.    CHIEF COMPLIANT  Chief Complaint   Patient presents with   • 6 month follow up      Patient states she just does not feel well over all.   • Hypertension       Active Problems:  Problem List Items Addressed This Visit    None    Problem list:  1.  Coronary artery disease.  1.1.  Stenting of the LAD, 5/27/2021.  1.2.  Residual disease of 50% in the circumflex with recommendations to treat that medically.  1.3 stress test 11/21: Small, mild inferior lateral wall ischemic defect cannot be excluded on sonographic, preserved post-rest EF of 81%  2.  Preserved systolic function.  3.  Hypertension  4.  Dyslipidemia  HPI  HPI  Ping Ivory is a 72-year-old female patient being followed up today for chronic arterial hypertension and coronary artery disease. Patient does have chronic arterial hypertension, which her blood pressure is elevated today at 171/89 mmHg with a heart rate of 64 beats per minute. She is on atenolol.The patient states she checks her blood pressure often at home. She also has a history of coronary artery disease, in which she had stenting to the LAD in 05/2021 with residual disease of 50 percent in the circumflex. She did have a preserved ejection fraction of 81 percent. She is also on atorvastatin for hyperlipidemia and coronary artery disease. She has p.r.n. Lasix and potassium, needed for lower extremity edema.    The patient is accompanied by an adult female.    She reports she had a sinus infection recently. She adds she has been to her doctor 3 times before her symptoms resolved. She adds she was prescribed antibiotics and steroids. She adds she has received multiple injections. She notes she is still hoarse. She reports her sinuses are better. She adds she was informed that if her sinus symptoms do not resolve, she will need an  x-ray of her sinuses. She stated part of the time she was at the doctors office her blood pressure was elevated, but not normally.    The patient states she would like to see Dr. Moreira to change her blood thinner medication. She adds she has bruises all over her body. She stated she fell months ago and she still has a bruise due to it.    She adds she is taking Lasix as needed. She adds her ankles never swell.    She cites she has pain in her hip and knee. She adds she has received injections in her hip in the past, that helped her pain. The injections are every 3 months. Her orthopedic doctor has retired, although she will be seeing a doctor Tami.    The patient states she does not feel well. She adds she thinks it is lingering from when she had COVID-19. She adds she does not feel like doing anything. The adult female states the patient has gone from cleaning like a phonetic to doing nothing. The adult female states the patient had move in with her for 2 months, she stated she would stay up later then her.    She adds she is not sleeping well.    The patient reports she was unable to obtain her medication from [facility], she received a letter from them stating for her doctor to send something else.    She denies any chest pain, shortness of breath, lower extremity edema, palpitations, fatigue, syncope, PND, orthopnea, or strokelike symptoms.  PRIOR MEDS  Current Outpatient Medications on File Prior to Visit   Medication Sig Dispense Refill   • aspirin (aspirin) 81 MG EC tablet Take 1 tablet by mouth Daily. 30 tablet 5   • atenolol (TENORMIN) 25 MG tablet Take 1 tablet by mouth Daily. 90 tablet 3   • atorvastatin (LIPITOR) 40 MG tablet Take 1 tablet by mouth Daily. 90 tablet 3   • clopidogrel (PLAVIX) 75 MG tablet TAKE 1 TABLET DAILY 90 tablet 3   • docusate sodium (Colace) 100 MG capsule Take 1 capsule by mouth 2 (Two) Times a Day As Needed for Constipation. 90 capsule 3   • furosemide (LASIX) 20 MG tablet Take 1  tablet by mouth Daily As Needed (with increased swelling or weight gain 3 pounds overnight). 90 tablet 0   • potassium chloride 10 MEQ CR tablet Take 1 tablet by mouth Daily As Needed (with lasix). 90 tablet 0   • [DISCONTINUED] hydrOXYzine pamoate (Vistaril) 50 MG capsule Take 1 capsule by mouth At Night As Needed (insomnia). 30 capsule 0     No current facility-administered medications on file prior to visit.       ALLERGIES  Betadine [povidone iodine], Regadenoson, Ciprofloxacin hcl, Keflex [cephalexin], and Penicillins    HISTORY  Past Medical History:   Diagnosis Date   • COVID 08/13/2022   • Hypertension        Social History     Socioeconomic History   • Marital status:    Tobacco Use   • Smoking status: Never   • Smokeless tobacco: Never   Substance and Sexual Activity   • Alcohol use: Never   • Drug use: Never   • Sexual activity: Defer       Family History   Problem Relation Age of Onset   • Heart attack Mother    • Heart disease Mother    • Hypertension Mother    • Heart attack Father    • Heart disease Father    • Heart failure Father        Review of Systems   Constitutional: Negative for chills, fatigue and fever.   HENT: Positive for sinus pressure and sinus pain (has had Sinus infection). Negative for congestion, rhinorrhea and sore throat.    Respiratory: Negative for chest tightness and shortness of breath.    Cardiovascular: Negative for chest pain, palpitations and leg swelling.   Gastrointestinal: Negative for constipation, diarrhea and nausea.   Musculoskeletal: Negative for arthralgias, back pain and neck pain.   Allergic/Immunologic: Positive for environmental allergies. Negative for food allergies.   Neurological: Positive for dizziness (getting up and down does not happen often does get vertigo sometimes) and light-headedness. Negative for syncope and weakness.   Hematological: Bruises/bleeds easily.   Psychiatric/Behavioral: Positive for sleep disturbance (No SOB does not sleep  "well).       Objective     VITALS: /89 (BP Location: Left arm, Patient Position: Sitting, Cuff Size: Adult)   Pulse 64   Ht 172.4 cm (67.87\")   Wt 72.1 kg (159 lb)   SpO2 97%   BMI 24.27 kg/m²     LABS:   Lab Results (most recent)     None          IMAGING:   No Images in the past 120 days found..  Today's blood pressure is 171/89 mmHg with a heart rate of 64 beats per minute.  She had stenting to the LAD in 05/2021 with residual disease of 50 percent in the circumflex. She did have a preserved ejection fraction of 81 percent.     EXAM:  Physical Exam  Vitals and nursing note reviewed.   Constitutional:       Appearance: She is well-developed.   HENT:      Head: Normocephalic.   Eyes:      Pupils: Pupils are equal, round, and reactive to light.   Neck:      Thyroid: No thyroid mass.      Vascular: No carotid bruit or JVD.      Trachea: Trachea and phonation normal.   Cardiovascular:      Rate and Rhythm: Normal rate and regular rhythm.      Pulses:           Radial pulses are 2+ on the right side and 2+ on the left side.        Posterior tibial pulses are 2+ on the right side and 2+ on the left side.      Heart sounds: Murmur heard.     No friction rub. No gallop.   Pulmonary:      Effort: Pulmonary effort is normal. No respiratory distress.      Breath sounds: Normal breath sounds. No wheezing or rales.   Abdominal:      General: Bowel sounds are normal.      Palpations: Abdomen is soft.   Musculoskeletal:         General: No swelling. Normal range of motion.      Cervical back: Neck supple.   Skin:     General: Skin is warm and dry.      Capillary Refill: Capillary refill takes less than 2 seconds.      Findings: No rash.   Neurological:      Mental Status: She is alert and oriented to person, place, and time.   Psychiatric:         Speech: Speech normal.         Behavior: Behavior normal.         Thought Content: Thought content normal.         Judgment: Judgment normal.         Procedure "   Procedures       Assessment & Plan    Diagnosis Plan   1. Essential hypertension  atenolol (TENORMIN) 25 MG tablet    CBC & Differential    Comprehensive Metabolic Panel    TSH    Lipid Panel    Magnesium    Vitamin D 1,25 Dihydroxy    Vitamin B12    Folate      2. Coronary artery disease of native artery of native heart with stable angina pectoris (HCC)  atorvastatin (LIPITOR) 40 MG tablet    CBC & Differential    Comprehensive Metabolic Panel    TSH    Lipid Panel    Magnesium    Vitamin D 1,25 Dihydroxy    Vitamin B12    Folate      3. Post-COVID chronic fatigue  CBC & Differential    Comprehensive Metabolic Panel    TSH    Lipid Panel    Magnesium    Vitamin D 1,25 Dihydroxy    Vitamin B12    Folate      4. Coronary artery disease due to calcified coronary lesion  CBC & Differential    Comprehensive Metabolic Panel    TSH    Lipid Panel    Magnesium    Vitamin D 1,25 Dihydroxy    Vitamin B12    Folate      5. Malaise and fatigue  CBC & Differential    Comprehensive Metabolic Panel    TSH    Lipid Panel    Magnesium    Vitamin D 1,25 Dihydroxy    Vitamin B12    Folate      Cardiac health maintenance  1. The patient presents today for a follow-up. The patient's laboratory results were discussed in full detail with the patient.  2. The patient's prescriptions for aspirin 81 mg and atorvastatin 20 mg have been sent.  3. The patient was advised to monitor her blood pressure on a daily basis and report any significant highs or lows along with any adverse effects with her prescription for atenolol 25 mg has been sent.  If her blood pressure continues to be elevated we will further titrate her antihypertensive therapy.  She feels that her blood pressure is elevated mainly because of the treatments for the sinus infection which she has been experiencing in which she sought care on 3 different occasions and she is finally starting to get over it after antibiotic and steroid administration.  4. An order has been placed  for lab work to include CBC, CMP, Vitamin D, electrolytes, vitamin B12, magnesium, and TSH.  Hopefully this will help identify potential causes of patient's fatigue  5.  Informed of signs and symptoms of ACS and advised to seek emergent treatment for any new worsening symptoms.  Patient also advised sooner follow-up as needed.  Also advised to follow-up with family doctor as needed  This note is dictated utilizing voice recognition software.  Although this record has been proof read, transcriptional errors may still be present. If questions occur regarding the content of this record please do not hesitate to call our office.  I have reviewed and confirmed the accuracy of the ROS as documented by the MA/LPN/RN MAYA Pruitt    Return in about 6 months (around 9/27/2023), or if symptoms worsen or fail to improve.    Diagnoses and all orders for this visit:    1. Essential hypertension (Primary)  -     atenolol (TENORMIN) 25 MG tablet; Take 1 tablet by mouth Daily.  Dispense: 90 tablet; Refill: 3  -     CBC & Differential; Future  -     Comprehensive Metabolic Panel; Future  -     TSH; Future  -     Lipid Panel; Future  -     Magnesium; Future  -     Vitamin D 1,25 Dihydroxy; Future  -     Vitamin B12; Future  -     Folate; Future    2. Coronary artery disease of native artery of native heart with stable angina pectoris (HCC)  -     atorvastatin (LIPITOR) 40 MG tablet; Take 1 tablet by mouth Daily.  Dispense: 90 tablet; Refill: 3  -     CBC & Differential; Future  -     Comprehensive Metabolic Panel; Future  -     TSH; Future  -     Lipid Panel; Future  -     Magnesium; Future  -     Vitamin D 1,25 Dihydroxy; Future  -     Vitamin B12; Future  -     Folate; Future    3. Post-COVID chronic fatigue  -     CBC & Differential; Future  -     Comprehensive Metabolic Panel; Future  -     TSH; Future  -     Lipid Panel; Future  -     Magnesium; Future  -     Vitamin D 1,25 Dihydroxy; Future  -     Vitamin B12; Future  -      Folate; Future    4. Coronary artery disease due to calcified coronary lesion  -     CBC & Differential; Future  -     Comprehensive Metabolic Panel; Future  -     TSH; Future  -     Lipid Panel; Future  -     Magnesium; Future  -     Vitamin D 1,25 Dihydroxy; Future  -     Vitamin B12; Future  -     Folate; Future    5. Malaise and fatigue  -     CBC & Differential; Future  -     Comprehensive Metabolic Panel; Future  -     TSH; Future  -     Lipid Panel; Future  -     Magnesium; Future  -     Vitamin D 1,25 Dihydroxy; Future  -     Vitamin B12; Future  -     Folate; Future    Other orders  -     Discontinue: hydrOXYzine pamoate (Vistaril) 50 MG capsule; Take 1 capsule by mouth At Night As Needed (insomnia).  Dispense: 90 capsule; Refill: 0  -     hydrOXYzine pamoate (Vistaril) 50 MG capsule; Take 1 capsule by mouth At Night As Needed (insomnia).  Dispense: 90 capsule; Refill: 0          Pingharika Ivory  reports that she has never smoked. She has never used smokeless tobacco.. I have educated her on the risk of diseases from using tobacco products.     Advance Care Planning   ACP discussion was held with the patient during this visit. Patient does not have an advance directive, declines further assistance.          MEDS ORDERED DURING VISIT:  No orders of the defined types were placed in this encounter.          This document has been electronically signed by MAYA Pruitt Jr.  March 27, 2023 14:40 EDT       Transcribed from ambient dictation for MAYA Pruitt by Celeste Aden.  03/27/23   17:50 EDT    Patient or patient representative verbalized consent to the visit recording.  I have personally performed the services described in this document as transcribed by the above individual, and it is both accurate and complete.

## 2023-03-28 LAB
FOLATE SERPL-MCNC: 16.5 NG/ML (ref 4.78–24.2)
VIT B12 BLD-MCNC: 356 PG/ML (ref 211–946)

## 2023-03-29 ENCOUNTER — TELEPHONE (OUTPATIENT)
Dept: CARDIOLOGY | Facility: CLINIC | Age: 73
End: 2023-03-29
Payer: MEDICARE

## 2023-03-29 NOTE — TELEPHONE ENCOUNTER
----- Message from MAYA Pruitt sent at 3/29/2023 12:29 AM EDT -----  Normal CMP with the exception of an elevated AST which is comparable to that of 1 year ago.  It has increased from 41-49.      Triglycerides 50 HDL 64 LDL 40.      Normal folate and B12.      Normal TSH and magnesium and CBC.  Keep follow-up.    I called and went over above lab results as listed above .      Aide KINGA     no abdominal pain, no bloating, no constipation, no diarrhea, no nausea and no vomiting.

## 2023-03-29 NOTE — PROGRESS NOTES
Normal CMP with the exception of an elevated AST which is comparable to that of 1 year ago.  It has increased from 41-49.      Triglycerides 50 HDL 64 LDL 40.      Normal folate and B12.      Normal TSH and magnesium and CBC.  Keep follow-up.

## 2023-03-30 LAB — 1,25(OH)2D SERPL-MCNC: 35.5 PG/ML (ref 24.8–81.5)

## 2023-04-16 RX ORDER — AMLODIPINE BESYLATE 5 MG/1
5 TABLET ORAL DAILY
Qty: 30 TABLET | Refills: 11 | Status: SHIPPED | OUTPATIENT
Start: 2023-04-16 | End: 2023-04-16

## 2023-04-16 RX ORDER — AMLODIPINE BESYLATE 5 MG/1
5 TABLET ORAL DAILY
Qty: 90 TABLET | Refills: 3 | Status: SHIPPED | OUTPATIENT
Start: 2023-04-16 | End: 2023-06-27 | Stop reason: SDUPTHER

## 2023-04-17 ENCOUNTER — TELEPHONE (OUTPATIENT)
Dept: CARDIOLOGY | Facility: CLINIC | Age: 73
End: 2023-04-17
Payer: MEDICARE

## 2023-04-17 NOTE — TELEPHONE ENCOUNTER
Received pt's BP log from JR, per him:   Start on Amlodipine 5mg daily, call or drop off updated readings in 2 weeks.        I called and informed pt of the above, she is aware to monitor BP x2 weeks after she received new rx from mail order.

## 2023-09-27 ENCOUNTER — OFFICE VISIT (OUTPATIENT)
Dept: CARDIOLOGY | Facility: CLINIC | Age: 73
End: 2023-09-27
Payer: MEDICARE

## 2023-09-27 VITALS
DIASTOLIC BLOOD PRESSURE: 88 MMHG | HEART RATE: 75 BPM | BODY MASS INDEX: 22.31 KG/M2 | SYSTOLIC BLOOD PRESSURE: 182 MMHG | OXYGEN SATURATION: 99 % | WEIGHT: 147.2 LBS | HEIGHT: 68 IN

## 2023-09-27 DIAGNOSIS — M79.89 LEG SWELLING: ICD-10-CM

## 2023-09-27 DIAGNOSIS — I51.89 GRADE I DIASTOLIC DYSFUNCTION: ICD-10-CM

## 2023-09-27 DIAGNOSIS — I10 ESSENTIAL HYPERTENSION: Primary | ICD-10-CM

## 2023-09-27 PROCEDURE — 93000 ELECTROCARDIOGRAM COMPLETE: CPT | Performed by: NURSE PRACTITIONER

## 2023-09-27 PROCEDURE — 1159F MED LIST DOCD IN RCRD: CPT | Performed by: NURSE PRACTITIONER

## 2023-09-27 PROCEDURE — 3079F DIAST BP 80-89 MM HG: CPT | Performed by: NURSE PRACTITIONER

## 2023-09-27 PROCEDURE — 3077F SYST BP >= 140 MM HG: CPT | Performed by: NURSE PRACTITIONER

## 2023-09-27 PROCEDURE — 99213 OFFICE O/P EST LOW 20 MIN: CPT | Performed by: NURSE PRACTITIONER

## 2023-09-27 PROCEDURE — 1160F RVW MEDS BY RX/DR IN RCRD: CPT | Performed by: NURSE PRACTITIONER

## 2023-09-27 NOTE — PROGRESS NOTES
Subjective     Pnig Ivory is a 73 y.o. female who presents to day for 6 month follow up and Hypertension.    CHIEF COMPLIANT  Chief Complaint   Patient presents with    6 month follow up    Hypertension       Active Problems:  Problem List Items Addressed This Visit    None  Problem list:  1.  Coronary artery disease.  1.1.  Stenting of the LAD, 5/27/2021.  1.2.  Residual disease of 50% in the circumflex with recommendations to treat that medically.  1.3 stress test 11/21: Small, mild inferior lateral wall ischemic defect cannot be excluded on sonographic, preserved post-rest EF of 81%  2.  Preserved systolic function.  3.  Hypertension  4.  Dyslipidemia    HPI  HPI  Ping Ivory is a 73-year-old female patient being followed up today for coronary artery disease and chronic arterial hypertension. Patient does have a history of coronary artery disease with stenting to the LAD. She had 50 percent residual in the circumflex as well in 2021. Patient is on aspirin and atorvastatin for antiplatelet therapy, high intensity statin therapy. She is on amlodipine and atenolol for her chronic arterial hypertension. Today, her blood pressure is substantially elevated at 182/88 mmHg.    The patient states she is doing well. She has been experiencing edema in her lower extremities. She states she can go to town and be on her feet more than usual and when she returns, she can hardly remove her shoes. She notes she does not take Lasix all the time, because she was advised to use it when she gains over 3 pounds over night.    She reports her blood pressure has been good at home, and she monitors it daily. She notes this morning prior to leaving her house, her blood pressure was 146/84 mmHg.    The patient denies chest pain or dyspnea. She is able to do what she wants.    She maintains having trouble with her hip. She states it is difficult to go up and down the steps. She recently received an injection last week. She receives an  injection every 3 months.    She notes she had her wellness checkup and she brought in her lab work results. She was advised to take 1500 mg of vitamin D. The patient denies needing any refills on her medications.    The patient adds that her sister had open heart surgery in the past. Her daughter passed away in 07/2023 at 54 years old. She had a heart attack after she had a heart catheterization.    PRIOR MEDS  Current Outpatient Medications on File Prior to Visit   Medication Sig Dispense Refill    amLODIPine (NORVASC) 10 MG tablet Take 1 tablet by mouth Daily. 30 tablet 11    aspirin (aspirin) 81 MG EC tablet Take 1 tablet by mouth Daily. 30 tablet 5    atenolol (TENORMIN) 25 MG tablet Take 1 tablet by mouth Daily. 90 tablet 3    atorvastatin (LIPITOR) 40 MG tablet Take 1 tablet by mouth Daily. 90 tablet 3    Cholecalciferol (VITAMIN D-3 PO) Take 1,500 mg by mouth Daily.      furosemide (LASIX) 20 MG tablet Take 1 tablet by mouth Daily As Needed (with increased swelling or weight gain 3 pounds overnight). 90 tablet 0    potassium chloride 10 MEQ CR tablet Take 1 tablet by mouth Daily As Needed (with lasix). 90 tablet 0    [DISCONTINUED] docusate sodium (Colace) 100 MG capsule Take 1 capsule by mouth 2 (Two) Times a Day As Needed for Constipation. 90 capsule 3    [DISCONTINUED] hydrOXYzine pamoate (Vistaril) 50 MG capsule Take 1 capsule by mouth At Night As Needed (insomnia). 90 capsule 0     No current facility-administered medications on file prior to visit.       ALLERGIES  Betadine [povidone iodine], Regadenoson, Ciprofloxacin hcl, Keflex [cephalexin], and Penicillins    HISTORY  Past Medical History:   Diagnosis Date    COVID 08/13/2022    Hypertension        Social History     Socioeconomic History    Marital status:    Tobacco Use    Smoking status: Never    Smokeless tobacco: Never   Substance and Sexual Activity    Alcohol use: Never    Drug use: Never    Sexual activity: Defer       Family  "History   Problem Relation Age of Onset    Heart attack Mother     Heart disease Mother     Hypertension Mother     Heart attack Father     Heart disease Father     Heart failure Father        Review of Systems   Constitutional:  Negative for chills, fatigue and fever.   HENT:  Negative for congestion, rhinorrhea and sore throat.    Eyes:  Negative for visual disturbance.   Respiratory:  Negative for apnea, chest tightness and shortness of breath.    Cardiovascular:  Positive for leg swelling (ankles at times she can not get her shoes on). Negative for chest pain and palpitations.   Gastrointestinal:  Positive for constipation. Negative for diarrhea and nausea.   Musculoskeletal:  Negative for arthralgias, back pain and neck pain.   Allergic/Immunologic: Positive for environmental allergies. Negative for food allergies.   Neurological:  Negative for dizziness, syncope, weakness and light-headedness.   Hematological:  Bruises/bleeds easily.   Psychiatric/Behavioral:  Positive for sleep disturbance (No SOB does not sleep well).      Objective     VITALS: BP (!) 182/88 (BP Location: Left arm, Patient Position: Sitting, Cuff Size: Adult)   Pulse 75   Ht 172.4 cm (67.87\")   Wt 66.8 kg (147 lb 3.2 oz)   SpO2 99%   BMI 22.46 kg/mý     LABS:   Lab Results (most recent)    The patient's laboratory workup was reviewed. Her lipid panel shows her triglycerides at 50 mg/dL, HDL at 71 mg/dL, and LDL at 29 mg/dL. Her TSH was 5.73 mIU/L. Her vitamin D was normal. Her glucose, kidney function, and electrolytes were normal. Her AST was slightly elevated at 51 U/L, previously it was 49 U/L.   None            IMAGING:   No Images in the past 120 days found..    EXAM:  Physical Exam  Vitals and nursing note reviewed.   Constitutional:       Appearance: She is well-developed.   HENT:      Head: Normocephalic.   Neck:      Thyroid: No thyroid mass.      Vascular: No carotid bruit or JVD.      Trachea: Trachea and phonation normal. "   Cardiovascular:      Rate and Rhythm: Normal rate and regular rhythm.      Pulses:           Radial pulses are 2+ on the right side and 2+ on the left side.        Posterior tibial pulses are 2+ on the right side and 2+ on the left side.      Heart sounds: Murmur heard.     No friction rub. No gallop.   Pulmonary:      Effort: Pulmonary effort is normal. No respiratory distress.      Breath sounds: Normal breath sounds. No wheezing or rales.   Musculoskeletal:         General: No swelling. Normal range of motion.      Cervical back: Neck supple.   Skin:     General: Skin is warm and dry.      Capillary Refill: Capillary refill takes less than 2 seconds.      Findings: No rash.   Neurological:      Mental Status: She is alert and oriented to person, place, and time.   Psychiatric:         Speech: Speech normal.         Behavior: Behavior normal.         Thought Content: Thought content normal.         Judgment: Judgment normal.       Procedure     ECG 12 Lead    Date/Time: 9/27/2023 2:57 PM  Performed by: Magen Foote APRN    Authorized by: Magen Foote APRN  Comparison: compared with previous ECG from 9/26/2022  Similar to previous ECG  Rhythm: sinus rhythm  Rate: normal  BPM: 60  QRS axis: normal  Other findings: non-specific ST-T wave changes  Comments: QTc 413 ms  No acute changes             Assessment & Plan    Diagnosis Plan   1. Essential hypertension  ECG 12 Lead      2. Leg swelling  ECG 12 Lead      3. Grade I diastolic dysfunction  ECG 12 Lead          Return if symptoms worsen or fail to improve, for Next scheduled follow up.    Diagnoses and all orders for this visit:    1. Essential hypertension (Primary)  -     ECG 12 Lead    2. Leg swelling  -     ECG 12 Lead    3. Grade I diastolic dysfunction  -     ECG 12 Lead    PLAN  Cardiac health maintenance  1. The patient's recent cardiac test results were reviewed and discussed in full detail with the patient.  2. The patient's medication regimen  was reviewed and discussed in full detail with the patient.  3. She was advised to take the Lasix as needed when she has swelling due to her lower extremity edema and diastolic dysfunction.   4. The patient was encouraged to monitor her blood pressure on a daily basis and report any significant highs or lows.  Today her blood pressure is elevated at 182/88 she says her blood pressures usually about 146/84 at home in the mornings.  She is on atenolol and amlodipine.  She will monitor her blood pressure at home return a log in 2 weeks for further antihypertensive medication titration  5.  Overall she feels like she is done relatively well from the cardiovascular standpoint.  She denies any angina anginal equivalent symptoms.  We will continue to monitor.  6.  Informed of signs and symptoms of ACS and advised to seek emergent treatment for any new worsening symptoms.  Patient also advised sooner follow-up as needed.  Also advised to follow-up with family doctor as needed  This note is dictated utilizing voice recognition software.  Although this record has been proof read, transcriptional errors may still be present. If questions occur regarding the content of this record please do not hesitate to call our office.  I have reviewed and confirmed the accuracy of the ROS as documented by the MA/LPN/RN MAYA Pruitt    The patient will follow-up in 6 to 7 months.    Assessment  1. Coronary artery disease  2. Chronic arterial hypertension  3. Lower extremity edema    Ping Ivory  reports that she has never smoked. She has never used smokeless tobacco.. I have educated her on the risk of diseases from using tobacco products.          MEDS ORDERED DURING VISIT:  No orders of the defined types were placed in this encounter.          This document has been electronically signed by MAYA Pruitt Jr.  September 27, 2023 15:04 EDT      Transcribed from ambient dictation for MAYA Pruitt by Celeste  Markie.  09/27/23   16:46 EDT    Patient or patient representative verbalized consent to the visit recording.  I have personally performed the services described in this document as transcribed by the above individual, and it is both accurate and complete.

## 2023-09-28 DIAGNOSIS — I25.84 CORONARY ARTERY DISEASE DUE TO CALCIFIED CORONARY LESION: ICD-10-CM

## 2023-09-28 DIAGNOSIS — I25.10 CORONARY ARTERY DISEASE DUE TO CALCIFIED CORONARY LESION: ICD-10-CM

## 2023-09-28 RX ORDER — CLOPIDOGREL BISULFATE 75 MG/1
TABLET ORAL
Qty: 90 TABLET | Refills: 3 | Status: SHIPPED | OUTPATIENT
Start: 2023-09-28